# Patient Record
Sex: FEMALE | Race: WHITE | NOT HISPANIC OR LATINO | Employment: UNEMPLOYED | URBAN - METROPOLITAN AREA
[De-identification: names, ages, dates, MRNs, and addresses within clinical notes are randomized per-mention and may not be internally consistent; named-entity substitution may affect disease eponyms.]

---

## 2021-04-26 ENCOUNTER — IMMUNIZATION (OUTPATIENT)
Dept: IMMUNIZATION | Facility: CLINIC | Age: 26
End: 2021-04-26

## 2021-06-01 ENCOUNTER — IMMUNIZATION (OUTPATIENT)
Dept: IMMUNIZATION | Facility: CLINIC | Age: 26
End: 2021-06-01
Attending: FAMILY MEDICINE

## 2021-07-08 ENCOUNTER — DOCUMENTATION (OUTPATIENT)
Dept: BARIATRICS | Facility: CLINIC | Age: 26
End: 2021-07-08

## 2021-07-08 RX ORDER — FLUVOXAMINE MALEATE 50 MG/1
50 TABLET, FILM COATED ORAL NIGHTLY
COMMUNITY
End: 2022-11-22 | Stop reason: SDUPTHER

## 2021-07-08 RX ORDER — DEXTROAMPHETAMINE SACCHARATE, AMPHETAMINE ASPARTATE, DEXTROAMPHETAMINE SULFATE AND AMPHETAMINE SULFATE 3.75; 3.75; 3.75; 3.75 MG/1; MG/1; MG/1; MG/1
15 TABLET ORAL 2 TIMES DAILY
COMMUNITY
End: 2022-06-06

## 2021-07-08 RX ORDER — LEVOTHYROXINE SODIUM 100 UG/1
100 TABLET ORAL
COMMUNITY
End: 2021-09-27 | Stop reason: SDUPTHER

## 2021-07-08 RX ORDER — ARIPIPRAZOLE 30 MG/1
TABLET ORAL
COMMUNITY
Start: 2021-04-11 | End: 2022-06-06

## 2021-07-08 RX ORDER — LIOTHYRONINE SODIUM 5 UG/1
TABLET ORAL
COMMUNITY
Start: 2021-04-10 | End: 2021-09-27 | Stop reason: SDUPTHER

## 2021-07-08 RX ORDER — HYDROXYZINE PAMOATE 50 MG/1
50 CAPSULE ORAL 3 TIMES DAILY PRN
COMMUNITY
End: 2022-02-23

## 2021-07-15 ENCOUNTER — HOSPITAL ENCOUNTER (EMERGENCY)
Facility: HOSPITAL | Age: 26
Discharge: LEFT BEFORE DISCHARGE | End: 2021-07-15
Payer: MEDICARE

## 2021-07-15 VITALS
BODY MASS INDEX: 39.86 KG/M2 | HEART RATE: 98 BPM | RESPIRATION RATE: 18 BRPM | SYSTOLIC BLOOD PRESSURE: 135 MMHG | HEIGHT: 66 IN | DIASTOLIC BLOOD PRESSURE: 64 MMHG | TEMPERATURE: 97.8 F | OXYGEN SATURATION: 100 % | WEIGHT: 248 LBS

## 2021-07-19 ENCOUNTER — TRANSCRIBE ORDERS (OUTPATIENT)
Dept: SCHEDULING | Age: 26
End: 2021-07-19

## 2021-07-19 DIAGNOSIS — N80.9 ENDOMETRIOSIS, UNSPECIFIED: Primary | ICD-10-CM

## 2021-09-27 ENCOUNTER — OFFICE VISIT (OUTPATIENT)
Dept: FAMILY MEDICINE | Facility: CLINIC | Age: 26
End: 2021-09-27
Payer: MEDICARE

## 2021-09-27 VITALS
RESPIRATION RATE: 18 BRPM | HEART RATE: 100 BPM | SYSTOLIC BLOOD PRESSURE: 124 MMHG | BODY MASS INDEX: 41.3 KG/M2 | DIASTOLIC BLOOD PRESSURE: 72 MMHG | OXYGEN SATURATION: 96 % | HEIGHT: 66 IN | WEIGHT: 257 LBS

## 2021-09-27 DIAGNOSIS — Z87.898 HISTORY OF IDIOPATHIC SEIZURE: ICD-10-CM

## 2021-09-27 DIAGNOSIS — F41.9 ANXIETY: ICD-10-CM

## 2021-09-27 DIAGNOSIS — Z79.899 LONG-TERM USE OF HIGH-RISK MEDICATION: ICD-10-CM

## 2021-09-27 DIAGNOSIS — I73.00 RAYNAUD'S DISEASE WITHOUT GANGRENE: ICD-10-CM

## 2021-09-27 DIAGNOSIS — Z13.1 SCREENING FOR DIABETES MELLITUS: Primary | ICD-10-CM

## 2021-09-27 DIAGNOSIS — E03.9 ACQUIRED HYPOTHYROIDISM: ICD-10-CM

## 2021-09-27 DIAGNOSIS — F43.10 PTSD (POST-TRAUMATIC STRESS DISORDER): ICD-10-CM

## 2021-09-27 DIAGNOSIS — R00.0 TACHYCARDIA: ICD-10-CM

## 2021-09-27 DIAGNOSIS — F90.0 ATTENTION DEFICIT HYPERACTIVITY DISORDER (ADHD), PREDOMINANTLY INATTENTIVE TYPE: ICD-10-CM

## 2021-09-27 DIAGNOSIS — N80.9 ENDOMETRIOSIS: ICD-10-CM

## 2021-09-27 DIAGNOSIS — M72.2 PLANTAR FASCIITIS: ICD-10-CM

## 2021-09-27 DIAGNOSIS — F31.9 BIPOLAR 1 DISORDER (CMS/HCC): ICD-10-CM

## 2021-09-27 DIAGNOSIS — F33.1 MODERATE EPISODE OF RECURRENT MAJOR DEPRESSIVE DISORDER (CMS/HCC): ICD-10-CM

## 2021-09-27 DIAGNOSIS — K21.9 GASTROESOPHAGEAL REFLUX DISEASE, UNSPECIFIED WHETHER ESOPHAGITIS PRESENT: ICD-10-CM

## 2021-09-27 DIAGNOSIS — Z13.0 SCREENING FOR DEFICIENCY ANEMIA: ICD-10-CM

## 2021-09-27 DIAGNOSIS — E87.1 HYPONATREMIA: ICD-10-CM

## 2021-09-27 DIAGNOSIS — Z51.81 MEDICATION MONITORING ENCOUNTER: ICD-10-CM

## 2021-09-27 PROCEDURE — 99205 OFFICE O/P NEW HI 60 MIN: CPT | Performed by: NURSE PRACTITIONER

## 2021-09-27 RX ORDER — TRAZODONE HYDROCHLORIDE 100 MG/1
100 TABLET ORAL NIGHTLY
COMMUNITY
End: 2022-02-23

## 2021-09-27 RX ORDER — SERTRALINE HYDROCHLORIDE 25 MG/1
25 TABLET, FILM COATED ORAL DAILY
COMMUNITY
End: 2022-02-23

## 2021-09-27 RX ORDER — LIOTHYRONINE SODIUM 5 UG/1
5 TABLET ORAL DAILY
Qty: 30 TABLET | Refills: 0 | Status: SHIPPED | OUTPATIENT
Start: 2021-09-27 | End: 2021-11-19 | Stop reason: SDUPTHER

## 2021-09-27 RX ORDER — PROPRANOLOL HYDROCHLORIDE 60 MG/1
60 CAPSULE, EXTENDED RELEASE ORAL DAILY
Qty: 30 CAPSULE | Refills: 3 | Status: SHIPPED | OUTPATIENT
Start: 2021-09-27 | End: 2022-02-23 | Stop reason: SDUPTHER

## 2021-09-27 RX ORDER — CHOLECALCIFEROL (VITAMIN D3) 50 MCG
2000 TABLET ORAL DAILY
Qty: 90 TABLET | Refills: 1 | Status: SHIPPED | OUTPATIENT
Start: 2021-09-27 | End: 2022-09-27

## 2021-09-27 RX ORDER — LEVOTHYROXINE SODIUM 100 UG/1
100 TABLET ORAL
Qty: 30 TABLET | Refills: 0 | Status: SHIPPED | OUTPATIENT
Start: 2021-09-27 | End: 2021-11-19 | Stop reason: SDUPTHER

## 2021-09-27 ASSESSMENT — ENCOUNTER SYMPTOMS
POLYDIPSIA: 0
COUGH: 0
ABDOMINAL PAIN: 0
ACTIVITY CHANGE: 0
CHILLS: 0
PALPITATIONS: 1
SHORTNESS OF BREATH: 0
ABDOMINAL DISTENTION: 0
POLYPHAGIA: 0
FATIGUE: 0
FEVER: 0
APPETITE CHANGE: 1
SEIZURES: 1
HEADACHES: 1
UNEXPECTED WEIGHT CHANGE: 0
DIAPHORESIS: 0

## 2021-09-27 ASSESSMENT — PATIENT HEALTH QUESTIONNAIRE - PHQ9
SUM OF ALL RESPONSES TO PHQ9 QUESTIONS 1 & 2: 6
SUM OF ALL RESPONSES TO PHQ QUESTIONS 1-9: 14

## 2021-09-27 NOTE — PROGRESS NOTES
Reason for visit:   Chief Complaint   Patient presents with   • Establish Care     Would like to discuss thyroid issues. Was seeing Dr. Maged Rivera who was prescribing synthroid.       HPI  is a 26 y.o. female who presents as new patient       HPI  Moved here Feb 2021     Needs thyroid medication- no surgery hx of hypothyroid which did not respond to synthroid alone- start cytomel   No thyroid labs since December and no meds since April   Prior labs were stable     Psych hx: lots of hospitalizations   Suicide attempt x 1 no admission- 18 yo took 2 bottles of pain killers   No SI since then   signficant depression   Bipolar with rapid cycling  Anxiety out of control- would like to get back to working   Feels like whole body - queasy and heart palpitations   On xanax in past but did not like it   Disability x 2-3 years     Last barbara about spring 2020 idea of grandeur and lack of sleep denies this currently     Eval with psychiatry Rhonda Zhang- [place]health   Current meds Abilify 30 mg   Adderal 15 mg twice daiily   Luvox 25 mg daily   zoloft 25 mg daily   Trazodone 100 mg nightly   Vistaril 50 mg three times daily     meds trialed that did not work  Buspar, vistaril    Anxiety is most of the day - am and mid afternoon and bedtime  [employer] has gym membership 3 times a week  Sleep: lots of nightmares but sleeps solid 8 hours sleep  No meditations but does affirmations   Therapist: Tori Mckeon - sees q 2 weeks     Engaged and then  moved to [place] -dated 19 mo now -good relationship   Living with his family and good support   Partner Jimmega reevest later today   Met on insta months ago -did date online     Hx of seizure as baby and age 14 work up negative per her report none since   No license       Problem List:  Patient Active Problem List    Diagnosis Date Noted   • Raynaud's disease without gangrene 09/27/2021   • PTSD (post-traumatic stress disorder) 09/27/2021   • Moderate episode of recurrent  major depressive disorder (CMS/[facility]) 09/27/2021   • Bipolar 1 disorder (CMS/[facility]) 09/27/2021   • Anxiety 09/27/2021   • Acquired hypothyroidism 09/27/2021   • Endometriosis 09/27/2021   • History of idiopathic seizure 09/27/2021       Surgical History:  Past Surgical History:   Procedure Laterality Date   • LAPAROSCOPIC ENDOMETRIOSIS FULGURATION      2016 AND 2018       Social History:  Social History     Social History Narrative   • Not on file       Family History:  Family History   Problem Relation Age of Onset   • Thyroid disease Biological Mother    • Thyroid disease Biological Father        Allergies:  Doxycycline    Current Medications:  Current Outpatient Medications   Medication Sig Dispense Refill   • ARIPiprazole (ABILIFY) 30 mg tablet      • dextroamphetamine-amphetamine (AdderalL) 15 mg tablet Take 15 mg by mouth 2 (two) times a day.     • fluvoxaMINE (LUVOX) 50 mg tablet Take 50 mg by mouth nightly.     • hydrOXYzine (VistariL) 50 mg capsule Take 50 mg by mouth 3 (three) times a day as needed for itching.     • norethindrone-e.estradiol-iron (JUNEL FE 24 ORAL) Take by mouth.     • sertraline (ZOLOFT) 25 mg tablet Take 25 mg by mouth daily.     • traZODone (DESYREL) 100 mg tablet Take 100 mg by mouth nightly.     • levothyroxine (SYNTHROID) 100 mcg tablet Take 100 mcg by mouth daily.     • liothyronine (CYTOMEL) 5 mcg tablet        No current facility-administered medications for this visit.         Review of Systems:  Review of Systems   Constitutional: Positive for appetite change (poor with anxiety). Negative for activity change, chills, diaphoresis, fatigue, fever and unexpected weight change.   Respiratory: Negative for cough and shortness of breath.    Cardiovascular: Positive for palpitations.   Gastrointestinal: Negative for abdominal distention and abdominal pain.        Gi symptoms when anxious otherwise ok    Endocrine: Positive for cold intolerance. Negative for heat intolerance, polydipsia,  polyphagia and polyuria.   Genitourinary: Negative for menstrual problem and pelvic pain.        No menses on BCP    Neurological: Positive for seizures and headaches (chronic unchanged ).       Objective     Vital Signs:  Vitals:    09/27/21 1350   BP: 124/72   Pulse: 100   Resp: 18   SpO2: 96%       BMI:  Body mass index is 41.48 kg/m².     Physical Exam  Constitutional:       General: She is not in acute distress.     Appearance: She is well-developed. She is not diaphoretic.   HENT:      Head: Normocephalic and atraumatic.      Mouth/Throat:      Pharynx: No oropharyngeal exudate.      Comments: Clear airway   Eyes:      General: No scleral icterus.        Right eye: No discharge.         Left eye: No discharge.      Conjunctiva/sclera: Conjunctivae normal.      Pupils: Pupils are equal, round, and reactive to light.   Neck:      Thyroid: No thyromegaly.      Trachea: No tracheal deviation.      Comments: Thyroid is not enlarged.  No nodules palpable.  Normal swallow     Cardiovascular:      Rate and Rhythm: Regular rhythm. Tachycardia present.      Pulses: Normal pulses.      Heart sounds: Normal heart sounds. No murmur heard.  No friction rub. No gallop.    Pulmonary:      Effort: Pulmonary effort is normal. No respiratory distress.      Breath sounds: Normal breath sounds. No wheezing.   Abdominal:      General: Bowel sounds are normal. There is distension.      Palpations: Abdomen is soft. There is no mass.      Tenderness: There is no abdominal tenderness. There is no guarding or rebound.      Hernia: No hernia is present.   Musculoskeletal:         General: No tenderness. Normal range of motion.      Cervical back: Normal range of motion and neck supple.      Comments: No tenderness to c/t/l spine    Lymphadenopathy:      Cervical: No cervical adenopathy.   Skin:     General: Skin is warm and dry.      Capillary Refill: Capillary refill takes less than 2 seconds.      Findings: No rash.      Comments: Mult  scars /cutting no new lesions    Neurological:      General: No focal deficit present.      Mental Status: She is alert and oriented to person, place, and time.      Sensory: No sensory deficit.      Motor: No abnormal muscle tone.      Coordination: Coordination normal.   Psychiatric:         Mood and Affect: Mood normal.         Behavior: Behavior normal.         Thought Content: Thought content normal.         Judgment: Judgment normal.         Recent labs before today:     No results found for: WBC, HGB, HCT, PLT, CHOL, TRIG, HDL, LDLC, ALT, AST, NA, K, CL, CREATININE, BUN, CO2, TSH, PSA, INR, GLUC, HGBA1C, MICROALBUR    Patient Instructions   LABS FASTING     Start inderal LA 60 mg daily to address anxiety/palpitations     Vitamin D 2000 units daily     Start back on thyroid medications     Labs again in 2 months but see me in 4 weeks     Psychiatrists      Dr Noemy Lazo[address] [place] accepts Medicare     Dr Hilda Lo neuro psych [address] [facility] [phone]       Kailyn Witt MD *(Psychiatry)*  [address] [place] [address]   [address]   [place] [address]   [phone]   Accepts many insurance plans. Not Medicaid.  Daytime hours only.  4 month wait time for new patient appt.        Crisis Hotlines     [facility] Crisis Intervention Hotline 1-772.698.7657     [facility] Crisis Services ([facility]) 24/7 Hotline 1-729.765.6744     National Text Crisis Line 946668     National Suicide Prevention Lifeline 1-130.927.3450  www.suicidepreventionlifeline.org    [facility] Crisis Services 1-243.281.9722    [facility] Child Abuse Report Line 1-124.212.1011    [facility] Crisis Services 1-425.207.4017    [facility] ([facility]) 1-244.452.7625    [facility] ([facility]) 1-891.569.3346    [place] Crisis Line (Mobile Crisis) 1-499.424.1383    [facility]’s Crisis Response Center 1-866.463.5451          A great place  to start is the website -Www.psychologytoday.sMedio or your insurance company or your EAP       [facility] [place] [phone]    [facility]  Dr Renetta Nuñez [facility] [phone]    Dr Elo Barillas PhD [phone]    Fany Santos [facility] [phone]    [facility] [phone]    [facility] [phone]  Therapy, also psychiatry on site to medication management, intake and evaluation, CBT for sleep issues     [facility] resource for families and adolescents as marital issues, crisis, divorce, trauma, ADHD and life coaching therapists as well as pyschiatrists  -Braydon Garcia     Psychiatry- Dr Walker practice see CJ N. At this practice in [place]    [facility] [address] [place] [address] [phone]    [facility] in [facility]     [facility] *(Therapy/Counseling Only)*  [address] [facility], [address]  [place] [address] [phone]  [email or web address]  See website for specialties-child, adolescent, adult, and couples.  Out-of network providers. Self-pay and submit to insurance company for reimbursement  Wait time unknown-please call directly.  Day and evening weekday appointments available.    [facility]  (*Psychiatry* and Therapy)  [address] [place] [address]. [address]  [place] [address] [phone]  Other locations: [place]-[phone] or [place]-[phone]  [email or web address]  Takes most insurance plans  NO Medicaid-But take some CHIP plans  Day, Evening, Weekend appointments  Multiple counselors with varying specialties  Wait time for new pt.-2-3 weeks  Psychiatrist available-wait time may be different.      [facility] (*Psychiatry* and Therapy)  “3/2019-In process of  hiring adolescent psychiatrist-only adult psychiatrist as of this date”  [address] [place] [address]  [phone]  Other location: [address] [place] [address]  *Lorelei Cheung was recommended by a patient*  [email or web address]  Takes most insurance plans  No Medicaid  Varying specialties-see website   Day, evening, and weekend appointments  Adult psychiatrist in practice and in process of hiring adolescent psychiatrist    [facility]   [address] [place] [address] [address]  [place] [address] [phone] (this # for appts at all locations)  Other locations: [address] [place] [address] and [place], [place]  [email or web address]  Takes most insurance, Takes some other CHIP  NO Aetna Better Health and No Medicaid  Varying specialties-see website  Day, evening, and weekend appointments  Afternoon appts. book 1 month out and nights and w/e hard to get.  New patient appt. approx. 1-2 weeks wait if can come during school hours.  Adult psychiatry only    [facility] *(Child, Adolescent, and Adult Psychiatry)*  Dr. Angelica Strickland MD and many other psychiatrists and therapists  [address] [place] [address]  Other location: [address] [place] [address] [phone]  www.[email or web address] MediahealthVMIX Mediachoices.Glad to Have You  Takes most insurance  Does Not take Medicaid or CHIP  2-3 week wait time for new patients  Day, evening, and weekend appts.  Child, Adolescent, and Adult Psychiatry available    [employer] *(Child, Adolescent, and Adult Psychiatry)*  Multiple Psychiatrists and therapists  [address] [place] [address]  Other locations that provide different services. Call for details.  [phone]  www.[email or web address]  Varying child, adolescent, and adult specialties-see website-ALSO, HAVE Wraparound services, postpartum depression, Intensive outpatient programs, Family Based, mobile services (in  home).  Take private insurances and Take MEDICAID  Day, evening, and weekend appts.  They will OPEN appt times for URGENT patient needs    [employer] * (Therapy/Counseling Only)*  [address] [place] [address] [address], [place] [address]  Virtual office available  [phone]  [email or web address]  See website for specialties-*PET Therapy*  *Free 30 minute Consultation*, out-of network providers, $105 per visit but offer a sliding scale so will take less if needed.  New patients are seen within 7 d’s.  Day, evening, and weekend appts as well as virtual appts.    [facility] *(Therapy/Counseling Only)*  [address] [facility]  [place] [address]  [phone]  [email or web address]  See website for specialties-Child, Adolescent, and Adult  3 week - 1.5 month wait time for New patient appt (depends on provider)  Out-of network providers. Self-pay and submit to insurance company for reimbursement        Dr Angeles neurologist ADD evaluation and other mental health issues [place] [phone]    Martine Ott [facility] -[phone]      [facility] *(Psychiatry)*  Jose Enrique Goodwin MD   [address] [facility], [address]   [place] [address]   [phone]   Child and Adolescent   Takes various insurance plans.  Day and early evening hours.    [facility]  (Therapeutic Day School, Intensive Outpatient Program, Outpatient Program, Holistic Therapy, Support Groups)  [address] [place] [address]   [place] [address]   [phone]  2nd location: [address] [place] [address]   [place] [address]  [phone]   [email or web address]     [facility] *(Child, Adolescent, and Adult Psychiatry)*  Dr. Quinten Giron  [address] [place] [address]  [phone]  [email or web address]  DOES NOT take insurance  Varying specialties-see website  1 month wait for new patients  Child, Adolescent, and  Adult Psychiatry available    [facility] *(Child, Adolescent, and Adult Psychiatry)*  ADAM Lobo CRNP  [address] [place] [address]  [phone]  [email or web address]  DOES NOT take insurance  Varying specialties-see website  6-8 week wait time for new patients  Hours only 9-5 Monday -Friday. No evening and No weekend appts.  Child, Adolescent, and Adult Psychiatry available        [facility]   [address] [facility]   [address]   [place], [place]   [phone]   [facility] does not have inpatient psychiatric    [facility]   Child and Adolescent Psychiatry and Behavioral Sciences   [place] [phone]  www.[facility]/centers-programs/child-and-adolescent-psychiatry-and-behavioral-sciences      [facility] is good for intensive treatment you can self refer as well    [facility] multiple locations for intensive day treatment    [facility] is great outpatient intensive program associated with [facility]     [facility] has psych evaluations as well as a psychiatric unit       [facility] [address]  [place] [address] [phone]  [email or web address]  Day, Evening, Weekend appointments  Multiple counselors with varying specialties  Takes most insurance plans  Medicaid-No  No wait time for new patients if flexible  No Psychiatrist in practice    [employer]  [facility] [facility] [address]  [place] [address] [phone]  [email or web address]  Insurance plans taken: United [employer]al Health, Cigna, and Aetna  Medicaid-No  Offers sliding scale payment according to website.  Varying specialties-see website  Call re: apt times available  No wait time for new patient if flexible  No Psychiatrist in practice                 If you do not hear from me regarding results in 7-10 days either via a  call or the portal please call.      Problem List Items Addressed This Visit        Circulatory    Raynaud's disease without gangrene       Digestive    Gastroesophageal reflux disease       Musculoskeletal    PTSD (post-traumatic stress disorder)    Relevant Medications    traZODone (DESYREL) 100 mg tablet    sertraline (ZOLOFT) 25 mg tablet    Plantar fasciitis       Endocrine/Metabolic    Acquired hypothyroidism    Relevant Medications    levothyroxine (SYNTHROID) 100 mcg tablet    liothyronine (CYTOMEL) 5 mcg tablet    propranolol LA (INDERAL LA) 60 mg 24 hr capsule    Other Relevant Orders    TSH w reflex FT4    T3, free    Hyponatremia       Other    Moderate episode of recurrent major depressive disorder (CMS/HCC)    Relevant Medications    traZODone (DESYREL) 100 mg tablet    sertraline (ZOLOFT) 25 mg tablet    Bipolar 1 disorder (CMS/HCC)    Relevant Medications    traZODone (DESYREL) 100 mg tablet    sertraline (ZOLOFT) 25 mg tablet    Anxiety    Relevant Medications    propranolol LA (INDERAL LA) 60 mg 24 hr capsule    cholecalciferol, vitamin D3, 50 mcg (2,000 unit) tablet    Endometriosis    History of idiopathic seizure    Attention deficit hyperactivity disorder (ADHD), predominantly inattentive type    Relevant Medications    traZODone (DESYREL) 100 mg tablet    sertraline (ZOLOFT) 25 mg tablet    Long-term use of high-risk medication      Other Visit Diagnoses     Screening for diabetes mellitus    -  Primary    Medication monitoring encounter        Relevant Orders    Comprehensive metabolic panel    Tachycardia        Relevant Medications    propranolol LA (INDERAL LA) 60 mg 24 hr capsule    Other Relevant Orders    CBC and differential    Screening for deficiency anemia            Great insight in her health    Fu 4 weeks     New meds indural LA 60 mg daily     Fu with psych and therapist -consider bid dosing on ADAM Finch  9/27/2021

## 2021-09-27 NOTE — PATIENT INSTRUCTIONS
LABS FASTING     Start inderal LA 60 mg daily to address anxiety/palpitations     Vitamin D 2000 units daily     Start back on thyroid medications     Labs again in 2 months but see me in 4 weeks     Psychiatrists      Dr Noemy [address]azo[address] [place] accepts Medicare     Dr Hilda Lo neuro psych [address] [phone]       Kailyn Witt MD *(Psychiatry)*  [address]   [address]   [place] [address] [phone]   Accepts many insurance plans. Not Medicaid.  Daytime hours only.  4 month wait time for new patient appt.        Crisis Hotlines     [facility] Crisis Intervention Hotline 1-790.720.6440     [facility] Crisis Services ([facility]) 24/7 Hotline 1-164.770.1179     National Text Crisis Line 092722     National Suicide Prevention Lifeline 1-421.159.6527  www.suicidepreventionlifeline.org    [facility] Crisis Services 1-688.221.7228    [facility] Child Abuse Report Line 1-205.343.3347    [facility] Crisis Services 1-926.311.1987    Etta Jordangerald ([facility]) [phone]    [facility] ([facility]) [phone]    [place] Crisis Line (Mobile Crisis) 1-226.449.7558    [facility] 1-352.835.9536          A great place to start is the website -Www.[employer] or your insurance company or your EAP       [facility] [phone]    [facility]  Dr Renetta Nuñez PsyD [phone]    Dr Elo Barillas PhD [phone]    Fany Santos [facility] [phone]    [facility] [phone]    [facility] [phone]  Therapy, also psychiatry on site to medication management, intake and evaluation, CBT for sleep issues     [facility].org resource for families and  adolescents as marital issues, crisis, divorce, trauma, ADHD and life coaching therapists as well as pyschiatrists  -Braydon Garcia     Psychiatry- Dr Walker practice see CJ N. At this practice in [place]    [facility] [address] [facility] [phone]    [facility] in [facility]     [facility] *(Therapy/Counseling Only)*  [address]  [place] [address]  [phone]  www.[facility] HospitalForeSee.Drug Response Dx  See website for specialties-child, adolescent, adult, and couples.  Out-of network providers. Self-pay and submit to insurance company for reimbursement  Wait time unknown-please call directly.  Day and evening weekday appointments available.    [facility]  (*Psychiatry* and Therapy)  [address]  [place] [address]  [phone]  Other locations: Rbyeh-[phone] or [phone]  www.[facility]  Takes most insurance plans  NO Medicaid-But take some CHIP plans  Day, Evening, Weekend appointments  Multiple counselors with varying specialties  Wait time for new pt.-2-3 weeks  Psychiatrist available-wait time may be different.      [facility] (*Psychiatry* and Therapy)  “3/2019-In process of hiring adolescent psychiatrist-only adult psychiatrist as of this date”  [address] [place] [address] [phone]  Other location: [address] [place] [address]  *Lorelei Cheung was recommended by a patient*  www.[facility]com  Takes most insurance plans  No Medicaid  Varying specialties-see website   Day, evening, and weekend appointments  Adult psychiatrist in practice and in process of hiring adolescent psychiatrist    [facility]   [address] [place] [address] [address]  [place] [address] [phone] (this # for appts at all locations)  Other locations: [address] [place] [address]  and [place]  www.[email or web address].[email or web address]  Takes most insurance, Takes some other CHIP  NO Aetna Better [employer] and No Medicaid  Varying specialties-see website  Day, evening, and weekend appointments  Afternoon appts. book 1 month out and nights and w/e hard to get.  New patient appt. approx. 1-2 weeks wait if can come during school hours.  Adult psychiatry only    [facility] *(Child, Adolescent, and Adult Psychiatry)*  Dr. Angelica Strickland MD and many other psychiatrists and therapists  [address] [address]  Other location: [address] [place] [address] [phone]  www.behavioralEdCast Inc.healthEdCast Inc.choices.Edgeware  Takes most insurance  Does Not take Medicaid or CHIP  2-3 week wait time for new patients  Day, evening, and weekend appts.  Child, Adolescent, and Adult Psychiatry available    [facility] *(Child, Adolescent, and Adult Psychiatry)*  Multiple Psychiatrists and therapists  [address] [place] [address]  Other locations that provide different services. Call for details.  [phone]  www.[employer]Opiatalk  Varying child, adolescent, and adult specialties-see website-ALSO, HAVE Wraparound services, postpartum depression, Intensive outpatient programs, Family Based, mobile services (in home).  Take private insurances and Take MEDICAID  Day, evening, and weekend appts.  They will OPEN appt times for URGENT patient needs    [facility] * (Therapy/Counseling Only)*  [address] [place] [address], [place] [address]  Virtual office available  [phone]  www.[facility].net  See website for specialties-*PET Therapy*  *Free 30 minute Consultation*, out-of network providers, $105 per visit but offer a sliding scale so will take less if needed.  New patients are seen within 7 d’s.  Day, evening, and weekend appts as well as virtual appts.    [facility] *(Therapy/Counseling  Only)*  [address] [facility] [address] [place] [address]  [phone]  www.anxietyocd.Filmaster  See website for specialties-Child, Adolescent, and Adult  3 week - 1.5 month wait time for New patient appt (depends on provider)  Out-of network providers. Self-pay and submit to insurance company for reimbursement        Dr Angeles neurologist ADD evaluation and other mental health issues [place] [phone]    Martine Ott [facility] [phone]      [facility] *(Psychiatry)*  Jose Enrique Goodwin MD   [address]   [place] [address]   [phone]   Child and Adolescent   Takes various insurance plans.  Day and early evening hours.    [facility]  (Therapeutic Day School, Intensive Outpatient Program, Outpatient Program, Holistic Therapy, Support Groups)  [address] [place] [address]   [phone]  2nd location: [address] [place] [address]   [place] [address]  [phone]   www.[email or web address]     [facility] *(Child, Adolescent, and Adult Psychiatry)*  Dr. Quniten Giron  [address] [place] [address]  [phone]  www.[employer]  DOES NOT take insurance  Varying specialties-see website  1 month wait for new patients  Child, Adolescent, and Adult Psychiatry available    [facility] *(Child, Adolescent, and Adult Psychiatry)*  ADAM Lobo CRNP  [address] [place] [address]  [phone]  www.[email or web address]  DOES NOT take insurance  Varying specialties-see website  6-8 week wait time for new patients  Hours only 9-5 Monday -Friday. No evening and No weekend appts.  Child, Adolescent, and Adult Psychiatry available        [facility]   [address] [place] [address]   [phone]   [facility] does not have inpatient psychiatric    [facility]   Child and Adolescent Psychiatry and Behavioral Sciences   [place] [phone]  www.[facility]/centers-programs/child-and-adolescent-psychiatry-and-behavioral-sciences      [facility] is good for intensive treatment you can self refer as well    [facility] multiple locations for intensive day treatment    [facility] is great outpatient intensive program associated with [facility]     [facility] has psych evaluations as well as a psychiatric unit       [facility]  [address]  [place] [address] [phone]  www.[employer]  Day, Evening, Weekend appointments  Multiple counselors with varying specialties  Takes most insurance plans  Medicaid-No  No wait time for new patients if flexible  No Psychiatrist in practice    [facility]  [address] [place] [address] [address]  [place] [address] [phone]  www.[employer]  Insurance plans taken: [facility], Cigna, and Aetna  Medicaid-No  Offers sliding scale payment according to website.  Varying specialties-see website  Call re: apt times available  No wait time for new patient if flexible  No Psychiatrist in practice

## 2021-10-21 ENCOUNTER — OFFICE VISIT (OUTPATIENT)
Dept: FAMILY MEDICINE | Facility: CLINIC | Age: 26
End: 2021-10-21
Payer: MEDICARE

## 2021-10-21 VITALS
TEMPERATURE: 98.2 F | DIASTOLIC BLOOD PRESSURE: 72 MMHG | HEART RATE: 61 BPM | WEIGHT: 257 LBS | HEIGHT: 66 IN | OXYGEN SATURATION: 98 % | SYSTOLIC BLOOD PRESSURE: 116 MMHG | RESPIRATION RATE: 18 BRPM | BODY MASS INDEX: 41.3 KG/M2

## 2021-10-21 DIAGNOSIS — H69.92 EUSTACHIAN TUBE DISORDER, LEFT: Primary | ICD-10-CM

## 2021-10-21 DIAGNOSIS — F43.10 PTSD (POST-TRAUMATIC STRESS DISORDER): ICD-10-CM

## 2021-10-21 DIAGNOSIS — F31.9 BIPOLAR 1 DISORDER (CMS/HCC): ICD-10-CM

## 2021-10-21 DIAGNOSIS — Z23 NEED FOR VACCINATION: ICD-10-CM

## 2021-10-21 DIAGNOSIS — F33.1 MODERATE EPISODE OF RECURRENT MAJOR DEPRESSIVE DISORDER (CMS/HCC): ICD-10-CM

## 2021-10-21 DIAGNOSIS — K13.0 CHEILOSIS: ICD-10-CM

## 2021-10-21 PROCEDURE — G0008 ADMIN INFLUENZA VIRUS VAC: HCPCS | Performed by: NURSE PRACTITIONER

## 2021-10-21 PROCEDURE — 99212 OFFICE O/P EST SF 10 MIN: CPT | Performed by: NURSE PRACTITIONER

## 2021-10-21 PROCEDURE — 90686 IIV4 VACC NO PRSV 0.5 ML IM: CPT | Performed by: NURSE PRACTITIONER

## 2021-10-21 RX ORDER — VORTIOXETINE 10 MG/1
10 TABLET, FILM COATED ORAL DAILY
COMMUNITY
End: 2022-06-06

## 2021-10-21 RX ORDER — NYSTATIN AND TRIAMCINOLONE ACETONIDE 100000; 1 [USP'U]/G; MG/G
OINTMENT TOPICAL
Qty: 60 G | Refills: 0 | Status: SHIPPED | OUTPATIENT
Start: 2021-10-21 | End: 2022-10-11

## 2021-10-21 ASSESSMENT — ENCOUNTER SYMPTOMS
HEADACHES: 0
SORE THROAT: 0
VOMITING: 0
COUGH: 0
ABDOMINAL PAIN: 0
RHINORRHEA: 0
DIARRHEA: 0

## 2021-10-21 NOTE — PROGRESS NOTES
Reason for visit:   Chief Complaint   Patient presents with   • Follow-up     was scheduled for possible ear infection - has since felt better and would now like to discuss refils.        HPI  is a 26 y.o. female     Earache / Otalgia   There is pain in the left ear. This is a new problem. Episode onset: few days ago  The problem has been rapidly improving. There has been no fever. Associated symptoms include ear discharge (lots of ear wax ). Pertinent negatives include no abdominal pain, coughing, diarrhea, headaches, hearing loss, rash, rhinorrhea, sore throat or vomiting. She has tried nothing for the symptoms.       Improved     About to run out of meds reports one day left -sees psych but reports they are not calling her back   Problem List:  Patient Active Problem List    Diagnosis Date Noted   • Raynaud's disease without gangrene 09/27/2021   • PTSD (post-traumatic stress disorder) 09/27/2021   • Moderate episode of recurrent major depressive disorder (CMS/[facility]) 09/27/2021   • Bipolar 1 disorder (CMS/[facility]) 09/27/2021   • Anxiety 09/27/2021   • Acquired hypothyroidism 09/27/2021   • Endometriosis 09/27/2021   • History of idiopathic seizure 09/27/2021   • Attention deficit hyperactivity disorder (ADHD), predominantly inattentive type 09/27/2021   • Hyponatremia 09/27/2021   • Plantar fasciitis 09/27/2021   • Gastroesophageal reflux disease 09/27/2021   • Long-term use of high-risk medication 09/27/2021       Surgical History:  Past Surgical History:   Procedure Laterality Date   • LAPAROSCOPIC ENDOMETRIOSIS FULGURATION      2016 AND 2018       Social History:  Social History     Social History Narrative    Moved from [place] for fiance met on insta        Enjoys gardening, wood burning, rose and painting         On disability x 3 years but would like to return to work            Family History:  Family History   Problem Relation Age of Onset   • Thyroid disease Biological Mother    • Mental illness Biological  Mother    • Thyroid disease Biological Father    • Mental illness Biological Father    • Depression Biological Sister    • Depression Biological Brother    • No Known Problems Maternal Grandmother    • Colon cancer Maternal Grandfather    • No Known Problems Paternal Grandmother    • No Known Problems Paternal Grandfather    • Depression Biological Sister    • Depression Biological Sister        Allergies:  Doxycycline    Current Medications:  Current Outpatient Medications   Medication Sig Dispense Refill   • ARIPiprazole (ABILIFY) 30 mg tablet      • cholecalciferol, vitamin D3, 50 mcg (2,000 unit) tablet Take 2,000 Units by mouth daily. 90 tablet 1   • dextroamphetamine-amphetamine (AdderalL) 15 mg tablet Take 15 mg by mouth 2 (two) times a day.     • fluvoxaMINE (LUVOX) 50 mg tablet Take 50 mg by mouth nightly.     • hydrOXYzine (VistariL) 50 mg capsule Take 50 mg by mouth 3 (three) times a day as needed for itching.     • levothyroxine (SYNTHROID) 100 mcg tablet Take 1 tablet (100 mcg total) by mouth daily. 30 tablet 0   • liothyronine (CYTOMEL) 5 mcg tablet Take 1 tablet (5 mcg total) by mouth daily. 30 tablet 0   • norethindrone-e.estradiol-iron (JUNEL FE 24 ORAL) Take by mouth.     • propranolol LA (INDERAL LA) 60 mg 24 hr capsule Take 1 capsule (60 mg total) by mouth daily. 30 capsule 3   • sertraline (ZOLOFT) 25 mg tablet Take 25 mg by mouth daily.     • traZODone (DESYREL) 100 mg tablet Take 100 mg by mouth nightly.     • vortioxetine (TRINTELLIX) 10 mg tablet Take 10 mg by mouth daily.       No current facility-administered medications for this visit.         Review of Systems:  Review of Systems   HENT: Positive for ear discharge (lots of ear wax ) and ear pain. Negative for hearing loss, rhinorrhea and sore throat.    Respiratory: Negative for cough.    Gastrointestinal: Negative for abdominal pain, diarrhea and vomiting.   Skin: Negative for rash.   Neurological: Negative for headaches.        Objective     Vital Signs:  Vitals:    10/21/21 1419   BP: 116/72   Pulse: 61   Resp: 18   Temp: 36.8 °C (98.2 °F)   SpO2: 98%       BMI:  Body mass index is 41.48 kg/m².     Physical Exam  Constitutional:       Appearance: Normal appearance.   HENT:      Head: Normocephalic and atraumatic.      Right Ear: Tympanic membrane normal. There is no impacted cerumen.      Left Ear: Tympanic membrane normal. There is no impacted cerumen.   Eyes:      Pupils: Pupils are equal, round, and reactive to light.   Cardiovascular:      Rate and Rhythm: Normal rate and regular rhythm.      Pulses: Normal pulses.   Pulmonary:      Effort: Pulmonary effort is normal.      Breath sounds: Normal breath sounds.   Skin:     Comments: + excoriations to sides of mouth with scaling no blisters    Neurological:      Mental Status: She is alert.   Psychiatric:         Mood and Affect: Mood normal.         Thought Content: Thought content normal.         Judgment: Judgment normal.      Comments: No barbara on exam          Recent labs before today:     No results found for: WBC, HGB, HCT, PLT, CHOL, TRIG, HDL, LDLC, ALT, AST, NA, K, CL, CREATININE, BUN, CO2, TSH, PSA, INR, GLUC, HGBA1C, MICROALBUR    Patient Instructions     Psychiatrists    Dr Kailyn Witt [address] [place] [address] [phone]    Martine Ott [facility] [phone]    Dr Noemy Lazo[address] [address] [address] [place] accepts Medicare     [facility]:  Anne-Marie Braun ( NP- does geriatric and adolescent)  Zo [facility] adolescent and women's issues and eating disorders [address] [place], [place] [phone]    Dr Hilda Lo neuro psych [place]     Maddie [facility] [phone]      If you do not hear from me regarding results in 7-10 days either via a call or the portal please call.      Problem List Items Addressed This Visit        Musculoskeletal    PTSD (post-traumatic stress disorder)    Relevant  Medications    vortioxetine (TRINTELLIX) 10 mg tablet       Other    Moderate episode of recurrent major depressive disorder (CMS/HCC)    Relevant Medications    vortioxetine (TRINTELLIX) 10 mg tablet    Bipolar 1 disorder (CMS/HCC)    Relevant Medications    vortioxetine (TRINTELLIX) 10 mg tablet      Other Visit Diagnoses     Eustachian tube disorder, left    -  Primary    Cheilosis        Relevant Medications    nystatin-triamcinolone ointment    Need for vaccination        Relevant Orders    Influenza vaccine quadrivalent preservative free 6 mon and older IM (FluLaval)      Offered to call psych with her but she then stated she has one week of medications left. She will work to obtain a call back and offered we are happy to assist if needed.  She is searching and has recc and calls out to other providers.  Due to the complexity of her care and PMH her medications should be managed by psychiatry and she was agreeable to this.      Ear is improving with negative exam.           ADAM Darden  10/21/2021

## 2021-10-21 NOTE — PATIENT INSTRUCTIONS
Psychiatrists    Dr Kailyn Witt [address] [facility] [phone]    Martine Ott [facility] [phone]    Dr Noemy Lazo[address] [facility] [address] [place] accepts Medicare     RBE:  Anne-Marie Braun ( NP- does geriatric and adolescent)  Zo Cabezas MSW adolescent and women's issues and eating disorders [address] [place], [place] [phone]    Dr Hilda Lo neuro psych [place]     [facility] [phone]    
(4) excellent

## 2021-10-22 PROCEDURE — G0008 ADMIN INFLUENZA VIRUS VAC: HCPCS | Performed by: NURSE PRACTITIONER

## 2021-10-22 PROCEDURE — 90686 IIV4 VACC NO PRSV 0.5 ML IM: CPT | Performed by: NURSE PRACTITIONER

## 2021-11-19 ENCOUNTER — OFFICE VISIT (OUTPATIENT)
Dept: FAMILY MEDICINE | Facility: CLINIC | Age: 26
End: 2021-11-19
Payer: MEDICARE

## 2021-11-19 VITALS
DIASTOLIC BLOOD PRESSURE: 80 MMHG | SYSTOLIC BLOOD PRESSURE: 120 MMHG | RESPIRATION RATE: 18 BRPM | HEART RATE: 81 BPM | OXYGEN SATURATION: 97 % | TEMPERATURE: 97.7 F | BODY MASS INDEX: 42.35 KG/M2 | WEIGHT: 262.4 LBS

## 2021-11-19 DIAGNOSIS — E03.9 ACQUIRED HYPOTHYROIDISM: ICD-10-CM

## 2021-11-19 DIAGNOSIS — N10 ACUTE PYELONEPHRITIS: ICD-10-CM

## 2021-11-19 DIAGNOSIS — M54.50 ACUTE BILATERAL LOW BACK PAIN WITHOUT SCIATICA: Primary | ICD-10-CM

## 2021-11-19 LAB
BILIRUBIN, POC: NEGATIVE
BLOOD URINE, POC: POSITIVE
CLARITY, POC: NORMAL
COLOR, POC: YELLOW
EXPIRATION DATE: NORMAL
GLUCOSE URINE, POC: NEGATIVE
KETONES, POC: NEGATIVE
LEUKOCYTE EST, POC: NORMAL
Lab: NORMAL
NITRITE, POC: NEGATIVE
PH, POC: 5
POCT MANUFACTURER: NORMAL
PROTEIN, POC: NORMAL
SPECIFIC GRAVITY, POC: 1.01
UROBILINOGEN, POC: 0.2

## 2021-11-19 PROCEDURE — 81002 URINALYSIS NONAUTO W/O SCOPE: CPT | Performed by: NURSE PRACTITIONER

## 2021-11-19 PROCEDURE — 99214 OFFICE O/P EST MOD 30 MIN: CPT | Performed by: NURSE PRACTITIONER

## 2021-11-19 RX ORDER — LIOTHYRONINE SODIUM 5 UG/1
5 TABLET ORAL DAILY
Qty: 30 TABLET | Refills: 0 | Status: SHIPPED | OUTPATIENT
Start: 2021-11-19 | End: 2022-10-11 | Stop reason: SDUPTHER

## 2021-11-19 RX ORDER — LEVOTHYROXINE SODIUM 100 UG/1
100 TABLET ORAL
Qty: 30 TABLET | Refills: 0 | Status: SHIPPED | OUTPATIENT
Start: 2021-11-19 | End: 2022-02-23

## 2021-11-19 RX ORDER — SULFAMETHOXAZOLE AND TRIMETHOPRIM 800; 160 MG/1; MG/1
1 TABLET ORAL 2 TIMES DAILY
Qty: 14 TABLET | Refills: 0 | Status: SHIPPED | OUTPATIENT
Start: 2021-11-19 | End: 2021-11-26

## 2021-11-19 ASSESSMENT — ENCOUNTER SYMPTOMS
WEIGHT LOSS: 0
FEVER: 0
ABDOMINAL PAIN: 0
DYSURIA: 0
BACK PAIN: 1
PERIANAL NUMBNESS: 0
WEAKNESS: 0
LEG PAIN: 0
BOWEL INCONTINENCE: 0

## 2021-11-19 NOTE — PROGRESS NOTES
Reason for visit:   Chief Complaint   Patient presents with   • Follow-up     possible bladder infection, back pain x 1.5 weeks        HPI  is a 26 y.o. female     Back Pain  This is a new problem. The current episode started in the past 7 days. The problem has been gradually worsening since onset. The pain is present in the lumbar spine. The quality of the pain is described as aching. The pain is moderate. The symptoms are aggravated by bending. Pertinent negatives include no abdominal pain, bladder incontinence, bowel incontinence, dysuria, fever, leg pain, pelvic pain, perianal numbness, weakness or weight loss. (No vaginal discharge   ) She has tried analgesics and NSAIDs for the symptoms. The treatment provided mild relief.         Problem List:  Patient Active Problem List    Diagnosis Date Noted   • Raynaud's disease without gangrene 09/27/2021   • PTSD (post-traumatic stress disorder) 09/27/2021   • Moderate episode of recurrent major depressive disorder (CMS/HCC) 09/27/2021   • Bipolar 1 disorder (CMS/[facility]) 09/27/2021   • Anxiety 09/27/2021   • Acquired hypothyroidism 09/27/2021   • Endometriosis 09/27/2021   • History of idiopathic seizure 09/27/2021   • Attention deficit hyperactivity disorder (ADHD), predominantly inattentive type 09/27/2021   • Hyponatremia 09/27/2021   • Plantar fasciitis 09/27/2021   • Gastroesophageal reflux disease 09/27/2021   • Long-term use of high-risk medication 09/27/2021       Surgical History:  Past Surgical History:   Procedure Laterality Date   • LAPAROSCOPIC ENDOMETRIOSIS FULGURATION      2016 AND 2018       Social History:  Social History     Social History Narrative    Moved from [place] for [facility] met on [employer]        Enjoys gardening, wood burning, rose and painting         On disability x 3 years but would like to return to work            Family History:  Family History   Problem Relation Age of Onset   • Thyroid disease Biological Mother    • Mental illness Biological  Mother    • Thyroid disease Biological Father    • Mental illness Biological Father    • Depression Biological Sister    • Depression Biological Brother    • No Known Problems Maternal Grandmother    • Colon cancer Maternal Grandfather    • No Known Problems Paternal Grandmother    • No Known Problems Paternal Grandfather    • Depression Biological Sister    • Depression Biological Sister        Allergies:  Doxycycline    Current Medications:  Current Outpatient Medications   Medication Sig Dispense Refill   • ARIPiprazole (ABILIFY) 30 mg tablet      • cholecalciferol, vitamin D3, 50 mcg (2,000 unit) tablet Take 2,000 Units by mouth daily. 90 tablet 1   • dextroamphetamine-amphetamine (AdderalL) 15 mg tablet Take 15 mg by mouth 2 (two) times a day.     • fluvoxaMINE (LUVOX) 50 mg tablet Take 50 mg by mouth nightly.     • hydrOXYzine (VistariL) 50 mg capsule Take 50 mg by mouth 3 (three) times a day as needed for itching.     • levothyroxine (SYNTHROID) 100 mcg tablet Take 1 tablet (100 mcg total) by mouth daily. 30 tablet 0   • norethindrone-e.estradiol-iron (JUNEL FE 24 ORAL) Take by mouth.     • propranolol LA (INDERAL LA) 60 mg 24 hr capsule Take 1 capsule (60 mg total) by mouth daily. 30 capsule 3   • vortioxetine (TRINTELLIX) 10 mg tablet Take 10 mg by mouth daily.     • liothyronine (CYTOMEL) 5 mcg tablet Take 1 tablet (5 mcg total) by mouth daily. 30 tablet 0   • nystatin-triamcinolone ointment Apply thin layer twice daily for one week then once daily for one week but stop at anytime the lesions/rash resolves.  Less is more 60 g 0   • sertraline (ZOLOFT) 25 mg tablet Take 25 mg by mouth daily.     • sulfamethoxazole-trimethoprim 800-160 mg per tablet Take 1 tablet by mouth 2 (two) times a day for 7 days. 14 tablet 0   • traZODone (DESYREL) 100 mg tablet Take 100 mg by mouth nightly.       No current facility-administered medications for this visit.         Review of Systems:  Review of Systems    Constitutional: Negative for fever and weight loss.   Gastrointestinal: Negative for abdominal pain and bowel incontinence.   Genitourinary: Negative for bladder incontinence, dysuria and pelvic pain.   Musculoskeletal: Positive for back pain.   Neurological: Negative for weakness.       Objective     Vital Signs:  Vitals:    11/19/21 1137   BP: 120/80   Pulse: 81   Resp: 18   Temp: 36.5 °C (97.7 °F)   SpO2: 97%       BMI:  Body mass index is 42.35 kg/m².     Physical Exam  Cardiovascular:      Rate and Rhythm: Normal rate and regular rhythm.   Pulmonary:      Effort: Pulmonary effort is normal.      Breath sounds: Normal breath sounds.   Abdominal:      General: Bowel sounds are normal.      Palpations: Abdomen is soft.      Tenderness: There is no abdominal tenderness. There is no right CVA tenderness, left CVA tenderness, guarding or rebound.      Hernia: No hernia is present.   Musculoskeletal:      Comments: - SLR  Normal gait   Normal strength BLE   No tenderness with palp t/l spine   No tenderness on paraspinals   Skin:     Capillary Refill: Capillary refill takes less than 2 seconds.   Neurological:      General: No focal deficit present.         Recent labs before today:     No results found for: WBC, HGB, HCT, PLT, CHOL, TRIG, HDL, LDLC, ALT, AST, NA, K, CL, CREATININE, BUN, CO2, TSH, PSA, INR, GLUC, HGBA1C, MICROALBUR    Patient Instructions   Labs when you get a chance     Start Bactrim DS one tablet twice daily for one week    We will call you with results     Please take all of the antibiotic above.      If you have back pain, fever, chills or sweats, see blood in your urine, have difficulty urinating please call our office urgently or go for urgent evaluation.      You may also use AZO (regular pyridium) take as directed while you are having painful urination or frequency.  This is safe for you and will help with symptoms and also improve the Ph of your urine.  It will turn your urine fluorescent  orange and that is normal and ok.  You may note some in your underwear as well as you may leak a bit with this infection.  It will resolve when you finish this medication.  You only need to take to help if you are having painful urination.  Once that resolves you may stop.      Please repeat a urine 2-6 weeks after you finish the antibiotic.  We like to ensure there is no blood in your urine as this would always require further work up if there is not an infection.          If you do not hear from me regarding results in 7-10 days either via a call or the portal please call.      Problem List Items Addressed This Visit        Endocrine/Metabolic    Acquired hypothyroidism    Relevant Medications    levothyroxine 100 mcg tablet    liothyronine 5 mcg tablet      Other Visit Diagnoses     Acute bilateral low back pain without sciatica    -  Primary    Relevant Orders    POCT urinalysis dipstick    Urine culture (clean catch)    Urinalysis with microscopic    Acute pyelonephritis        Relevant Medications    sulfamethoxazole-trimethoprim 800-160 mg per tablet    Other Relevant Orders    POCT urinalysis dipstick    Urine culture (clean catch)    Urinalysis with microscopic           stresed labs   Urine v positive     ADAM Darden  11/19/2021

## 2021-11-19 NOTE — PATIENT INSTRUCTIONS
Labs when you get a chance     Start Bactrim DS one tablet twice daily for one week    We will call you with results     Please take all of the antibiotic above.      If you have back pain, fever, chills or sweats, see blood in your urine, have difficulty urinating please call our office urgently or go for urgent evaluation.      You may also use AZO (regular pyridium) take as directed while you are having painful urination or frequency.  This is safe for you and will help with symptoms and also improve the Ph of your urine.  It will turn your urine fluorescent orange and that is normal and ok.  You may note some in your underwear as well as you may leak a bit with this infection.  It will resolve when you finish this medication.  You only need to take to help if you are having painful urination.  Once that resolves you may stop.      Please repeat a urine 2-6 weeks after you finish the antibiotic.  We like to ensure there is no blood in your urine as this would always require further work up if there is not an infection.

## 2021-11-20 LAB
APPEARANCE UR: ABNORMAL
BACTERIA #/AREA URNS HPF: ABNORMAL /HPF
BACTERIA UR CULT: NORMAL
BILIRUB UR QL STRIP: NEGATIVE
COLOR UR: YELLOW
GLUCOSE UR QL STRIP: NEGATIVE
HGB UR QL STRIP: NEGATIVE
HYALINE CASTS #/AREA URNS LPF: ABNORMAL /LPF
KETONES UR QL STRIP: NEGATIVE
LEUKOCYTE ESTERASE UR QL STRIP: ABNORMAL
NITRITE UR QL STRIP: NEGATIVE
PH UR STRIP: 5.5 [PH] (ref 5–8)
PROT UR QL STRIP: NEGATIVE
RBC #/AREA URNS HPF: ABNORMAL /HPF
SP GR UR STRIP: 1.02 (ref 1–1.03)
SQUAMOUS #/AREA URNS HPF: ABNORMAL /HPF
WBC #/AREA URNS HPF: ABNORMAL /HPF

## 2021-11-22 NOTE — RESULT ENCOUNTER NOTE
Geneva your urine did not grow out anything.  It looks like contamination.  Can you please let me know if you are still having symptoms.  If it all resolved then finish the bactrim- if not you can stop and let me know.

## 2022-02-23 ENCOUNTER — TELEMEDICINE (OUTPATIENT)
Dept: FAMILY MEDICINE | Facility: CLINIC | Age: 27
End: 2022-02-23
Payer: MEDICARE

## 2022-02-23 DIAGNOSIS — F41.1 GENERALIZED ANXIETY DISORDER: ICD-10-CM

## 2022-02-23 DIAGNOSIS — F31.9 BIPOLAR 1 DISORDER (CMS/HCC): ICD-10-CM

## 2022-02-23 DIAGNOSIS — J40 BRONCHITIS: Primary | ICD-10-CM

## 2022-02-23 PROCEDURE — 99214 OFFICE O/P EST MOD 30 MIN: CPT | Mod: 95 | Performed by: FAMILY MEDICINE

## 2022-02-23 RX ORDER — PROPRANOLOL HYDROCHLORIDE 60 MG/1
60 CAPSULE, EXTENDED RELEASE ORAL DAILY
Qty: 30 CAPSULE | Refills: 3 | Status: SHIPPED | OUTPATIENT
Start: 2022-02-23 | End: 2022-05-24

## 2022-02-23 RX ORDER — LEVOTHYROXINE SODIUM 100 UG/1
100 TABLET ORAL
COMMUNITY
End: 2022-10-11 | Stop reason: SDUPTHER

## 2022-02-23 RX ORDER — AZITHROMYCIN 250 MG/1
TABLET, FILM COATED ORAL
Qty: 6 TABLET | Refills: 0 | Status: SHIPPED | OUTPATIENT
Start: 2022-02-23 | End: 2022-02-28

## 2022-02-23 ASSESSMENT — ENCOUNTER SYMPTOMS
BACK PAIN: 0
HEADACHES: 0
RHINORRHEA: 0
WHEEZING: 1
SINUS PRESSURE: 0
FEVER: 0
CHEST TIGHTNESS: 0
SHORTNESS OF BREATH: 0
DIZZINESS: 0
LIGHT-HEADEDNESS: 0
SORE THROAT: 0
FATIGUE: 1
SLEEP DISTURBANCE: 0
COUGH: 1
APPETITE CHANGE: 0
NERVOUS/ANXIOUS: 1
ARTHRALGIAS: 0

## 2022-02-23 NOTE — PROGRESS NOTES
Verification of Patient Location:  The patient affirms they are currently located in the following state: [place]    Request for Consent:    Audio and Video Encounter   Dede, my name is Gerson DO Krystal.  Before we proceed, can you please verify your identification by telling me your full name and date of birth?  Can you tell me who is in the room with you?    You and I are about to have a telemedicine check-in or visit because you have requested it.  This is a live video-conference.  I am a real person, speaking to you in real time.  There is no one else with me on the video-conference.  However, when we use ([employer], [employer], etc) it is important for you to know that the video-conference may not be secure or private.  I am not recording this conversation and I am asking you not to record it.  This telemedicine visit will be billed to your health insurance or you, if you are self-insured.  You understand you will be responsible for any copayments or coinsurances that apply to your telemedicine visit.  Communication platform used for this encounter:  [employer]     Before starting our telemedicine visit, I am required to get your consent for this virtual check-in or visit by telemedicine. Do you consent?      Patient Response to Request for Consent:  Yes      Visit Documentation:  Subjective     Patient ID: Geneva Roman is a 26 y.o. female.  1995      HPI    Patient is c/o worsening wheezing and productive cough that started 2 weeks ago. Denies fever, chills, and SOB. Has tried cough drops with mild relief in symptoms. She ran out of her propranolol for anxiety and symptoms have been worsening and had one panic attack within the last few weeks.    The following have been reviewed and updated as appropriate in this visit:   Allergies  Meds  Problems       Review of Systems   Constitutional: Positive for fatigue. Negative for appetite change and fever.   HENT: Negative for hearing loss, rhinorrhea,  sinus pressure and sore throat.    Respiratory: Positive for cough and wheezing. Negative for chest tightness and shortness of breath.    Cardiovascular: Negative for chest pain.   Musculoskeletal: Negative for arthralgias and back pain.   Neurological: Negative for dizziness, light-headedness and headaches.   Psychiatric/Behavioral: Negative for behavioral problems, self-injury, sleep disturbance and suicidal ideas. The patient is nervous/anxious.          Assessment/Plan   Diagnoses and all orders for this visit:    Bronchitis (Primary)  Comments:  >7 days of worsening sx. continure conservative tx. prescribed zpak. ED precautions given. FU as needed  Orders:  -     azithromycin (ZITHROMAX) 250 mg tablet; Take 2 tablets the first day, then 1 tablet daily for 4 days.    Generalized anxiety disorder  Comments:  sx worsening since running out of propranolol. refills given. follow up as needed with pcp  Orders:  -     propranolol LA (INDERAL LA) 60 mg 24 hr capsule; Take 1 capsule (60 mg total) by mouth daily.    Bipolar 1 disorder (CMS/[facility])  Comments:  stable. cont follow up with pcp        Time Spent:  I spent 11 minutes on this date of service performing the following activities: obtaining history, entering orders, documenting, preparing for visit, obtaining / reviewing records and providing counseling and education.

## 2022-03-15 ENCOUNTER — OFFICE VISIT (OUTPATIENT)
Dept: FAMILY MEDICINE | Facility: CLINIC | Age: 27
End: 2022-03-15
Payer: MEDICARE

## 2022-03-15 VITALS
WEIGHT: 273 LBS | HEART RATE: 74 BPM | HEIGHT: 66 IN | TEMPERATURE: 97.9 F | OXYGEN SATURATION: 98 % | SYSTOLIC BLOOD PRESSURE: 116 MMHG | BODY MASS INDEX: 43.87 KG/M2 | DIASTOLIC BLOOD PRESSURE: 80 MMHG

## 2022-03-15 DIAGNOSIS — R05.9 COUGH: Primary | ICD-10-CM

## 2022-03-15 DIAGNOSIS — F17.200 SMOKER: ICD-10-CM

## 2022-03-15 PROCEDURE — 99213 OFFICE O/P EST LOW 20 MIN: CPT | Performed by: NURSE PRACTITIONER

## 2022-03-15 ASSESSMENT — ENCOUNTER SYMPTOMS
MYALGIAS: 0
RHINORRHEA: 0
COUGH: 1
WEIGHT LOSS: 0
SWEATS: 0
HEMOPTYSIS: 0
HEADACHES: 0
FEVER: 0
CHILLS: 0
SHORTNESS OF BREATH: 1
WHEEZING: 0
SORE THROAT: 0
HEARTBURN: 0

## 2022-03-15 NOTE — PROGRESS NOTES
Reason for visit:   Chief Complaint   Patient presents with   • Follow-up     Has had cough since Dec, was prescribed 2 different antibiotics, neither have worked.  Cough is deep and she has wheezing.       HPI  is a 26 y.o. female     Cough  This is a chronic problem. Episode onset: one month ago  The problem has been unchanged. The cough is productive of purulent sputum. Associated symptoms include shortness of breath (only going up and down stairs which is 10 times per day ). Pertinent negatives include no chest pain, chills, ear congestion, ear pain, fever, headaches, heartburn, hemoptysis, myalgias, nasal congestion, postnasal drip, rhinorrhea, sore throat, sweats, weight loss or wheezing. Exacerbated by: smoking, worse in am after sleeping. Treatments tried: zpak x 3 days which improved, Zyrtec not beneficial  There is no history of asthma, bronchiectasis or emphysema.       Chart reviewed seen with Dr Rice- 2/23 reported cough started 2 weeks ago   Given zpak    Seen in ED 2/26 stopped zpak and treated for UTI with macrobid     Denies pregnancy chance   Takes Junel routinely no menses       Problem List:  Patient Active Problem List    Diagnosis Date Noted   • Raynaud's disease without gangrene 09/27/2021   • PTSD (post-traumatic stress disorder) 09/27/2021   • Moderate episode of recurrent major depressive disorder (CMS/HCC) 09/27/2021   • Bipolar 1 disorder (CMS/[facility]) 09/27/2021   • Anxiety 09/27/2021   • Acquired hypothyroidism 09/27/2021   • Endometriosis 09/27/2021   • History of idiopathic seizure 09/27/2021   • Attention deficit hyperactivity disorder (ADHD), predominantly inattentive type 09/27/2021   • Hyponatremia 09/27/2021   • Plantar fasciitis 09/27/2021   • Gastroesophageal reflux disease 09/27/2021   • Long-term use of high-risk medication 09/27/2021       Surgical History:  Past Surgical History:   Procedure Laterality Date   • LAPAROSCOPIC ENDOMETRIOSIS FULGURATION      2016 AND 2018        Social History:  Social History     Social History Narrative    Moved from [place] for [place] met on [employer]        Enjoys gardening, wood burning, rose and painting         On disability x 3 years but would like to return to work            Family History:  Family History   Problem Relation Age of Onset   • Thyroid disease Biological Mother    • Mental illness Biological Mother    • Thyroid disease Biological Father    • Mental illness Biological Father    • Depression Biological Sister    • Depression Biological Brother    • No Known Problems Maternal Grandmother    • Colon cancer Maternal Grandfather    • No Known Problems Paternal Grandmother    • No Known Problems Paternal Grandfather    • Depression Biological Sister    • Depression Biological Sister        Allergies:  Doxycycline    Current Medications:  Current Outpatient Medications   Medication Sig Dispense Refill   • ARIPiprazole (ABILIFY) 30 mg tablet      • cholecalciferol, vitamin D3, 50 mcg (2,000 unit) tablet Take 2,000 Units by mouth daily. 90 tablet 1   • dextroamphetamine-amphetamine (AdderalL) 15 mg tablet Take 15 mg by mouth 2 (two) times a day.     • fluvoxaMINE (LUVOX) 50 mg tablet Take 50 mg by mouth nightly.     • levothyroxine (SYNTHROID) 100 mcg tablet Take 100 mcg by mouth daily.     • liothyronine 5 mcg tablet Take 1 tablet (5 mcg total) by mouth daily. 30 tablet 0   • norethindrone-e.estradiol-iron (JUNEL FE 24 ORAL) Take by mouth.     • propranolol LA (INDERAL LA) 60 mg 24 hr capsule Take 1 capsule (60 mg total) by mouth daily. 30 capsule 3   • nystatin-triamcinolone ointment Apply thin layer twice daily for one week then once daily for one week but stop at anytime the lesions/rash resolves.  Less is more (Patient not taking: Reported on 3/15/2022 ) 60 g 0   • vortioxetine (TRINTELLIX) 10 mg tablet Take 10 mg by mouth daily.       No current facility-administered medications for this visit.         Review of Systems:  Review of Systems    Constitutional: Negative for chills, fever and weight loss.   HENT: Negative for ear pain, postnasal drip, rhinorrhea and sore throat.    Respiratory: Positive for cough and shortness of breath (only going up and down stairs which is 10 times per day ). Negative for hemoptysis and wheezing.    Cardiovascular: Negative for chest pain.   Gastrointestinal: Negative for heartburn.   Musculoskeletal: Negative for myalgias.   Neurological: Negative for headaches.       Objective     Vital Signs:  Vitals:    03/15/22 1505   BP: 116/80   Pulse: 74   Temp: 36.6 °C (97.9 °F)   SpO2: 98%       BMI:  Body mass index is 44.06 kg/m².     Physical Exam  Constitutional:       Appearance: Normal appearance. She is not ill-appearing or diaphoretic.   HENT:      Head: Normocephalic and atraumatic.      Right Ear: Tympanic membrane normal.      Left Ear: Tympanic membrane normal.      Nose: No congestion or rhinorrhea.      Mouth/Throat:      Mouth: Mucous membranes are moist.      Pharynx: No posterior oropharyngeal erythema.   Eyes:      Extraocular Movements: Extraocular movements intact.      Pupils: Pupils are equal, round, and reactive to light.   Cardiovascular:      Rate and Rhythm: Normal rate and regular rhythm.      Pulses: Normal pulses.      Heart sounds: No murmur heard.  Pulmonary:      Breath sounds: Normal breath sounds.   Chest:      Chest wall: No tenderness.   Neurological:      Mental Status: She is alert.         Recent labs before today:     No results found for: WBC, HGB, HCT, PLT, CHOL, TRIG, HDL, LDLC, ALT, AST, NA, K, CL, CREATININE, BUN, CO2, TSH, PSA, INR, GLUC, HGBA1C, MICROALBUR    Patient Instructions   If chest xray negative I would try pepcid daily for 4 weeks to see if this helps cough    If cough continues let me know     Xray today     Stop smoking if you need help we have programs      Patient Education   Smoking Cessation, Tips for Success  If you are ready to quit smoking, congratulations! You  "have chosen to help yourself be healthier. Cigarettes bring nicotine, tar, carbon monoxide, and other irritants into your body. Your lungs, heart, and blood vessels will be able to work better without these poisons. There are many different ways to quit smoking. Nicotine gum, nicotine patches, a nicotine inhaler, or nicotine nasal spray can help with physical craving. Hypnosis, support groups, and medicines help break the habit of smoking.  WHAT THINGS CAN I DO TO MAKE QUITTING EASIER?   Here are some tips to help you quit for good:  · Pick a date when you will quit smoking completely. Tell all of your friends and family about your plan to quit on that date.  · Do not try to slowly cut down on the number of cigarettes you are smoking. Pick a quit date and quit smoking completely starting on that day.  · Throw away all cigarettes.    · Clean and remove all ashtrays from your home, work, and car.  · On a card, write down your reasons for quitting. Carry the card with you and read it when you get the urge to smoke.  · Cleanse your body of nicotine. Drink enough water and fluids to keep your urine clear or pale yellow. Do this after quitting to flush the nicotine from your body.  · Learn to predict your moods. Do not let a bad situation be your excuse to have a cigarette. Some situations in your life might tempt you into wanting a cigarette.  · Never have \"just one\" cigarette. It leads to wanting another and another. Remind yourself of your decision to quit.  · Change habits associated with smoking. If you smoked while driving or when feeling stressed, try other activities to replace smoking. Stand up when drinking your coffee. Brush your teeth after eating. Sit in a different chair when you read the paper. Avoid alcohol while trying to quit, and try to drink fewer caffeinated beverages. Alcohol and caffeine may urge you to smoke.  · Avoid foods and drinks that can trigger a desire to smoke, such as sugary or spicy " "foods and alcohol.  · Ask people who smoke not to smoke around you.  · Have something planned to do right after eating or having a cup of coffee. For example, plan to take a walk or exercise.  · Try a relaxation exercise to calm you down and decrease your stress. Remember, you may be tense and nervous for the first 2 weeks after you quit, but this will pass.  · Find new activities to keep your hands busy. Play with a pen, coin, or rubber band. Doodle or draw things on paper.  · Brush your teeth right after eating. This will help cut down on the craving for the taste of tobacco after meals. You can also try mouthwash.    · Use oral substitutes in place of cigarettes. Try using lemon drops, carrots, cinnamon sticks, or chewing gum. Keep them handy so they are available when you have the urge to smoke.  · When you have the urge to smoke, try deep breathing.  · Designate your home as a nonsmoking area.  · If you are a heavy smoker, ask your health care provider about a prescription for nicotine chewing gum. It can ease your withdrawal from nicotine.  · Reward yourself. Set aside the cigarette money you save and buy yourself something nice.  · Look for support from others. Join a support group or smoking cessation program. Ask someone at home or at work to help you with your plan to quit smoking.  · Always ask yourself, \"Do I need this cigarette or is this just a reflex?\" Tell yourself, \"Today, I choose not to smoke,\" or \"I do not want to smoke.\" You are reminding yourself of your decision to quit.  · Do not replace cigarette smoking with electronic cigarettes (commonly called e-cigarettes). The safety of e-cigarettes is unknown, and some may contain harmful chemicals.  · If you relapse, do not give up! Plan ahead and think about what you will do the next time you get the urge to smoke.  HOW WILL I FEEL WHEN I QUIT SMOKING?  You may have symptoms of withdrawal because your body is used to nicotine (the addictive " substance in cigarettes). You may crave cigarettes, be irritable, feel very hungry, cough often, get headaches, or have difficulty concentrating. The withdrawal symptoms are only temporary. They are strongest when you first quit but will go away within 10-14 days. When withdrawal symptoms occur, stay in control. Think about your reasons for quitting. Remind yourself that these are signs that your body is healing and getting used to being without cigarettes. Remember that withdrawal symptoms are easier to treat than the major diseases that smoking can cause.   Even after the withdrawal is over, expect periodic urges to smoke. However, these cravings are generally short lived and will go away whether you smoke or not. Do not smoke!  WHAT RESOURCES ARE AVAILABLE TO HELP ME QUIT SMOKING?  Your health care provider can direct you to community resources or hospitals for support, which may include:  · Group support.  · Education.  · Hypnosis.  · Therapy.     This information is not intended to replace advice given to you by your health care provider. Make sure you discuss any questions you have with your health care provider.     Document Released: 09/15/2005 Document Revised: 01/08/2016 Document Reviewed: 06/05/2014  [employer] Patient Education ©2016 [employer]         If you do not hear from me regarding results in 7-10 days either via a call or the portal please call.      Problem List Items Addressed This Visit     None      Visit Diagnoses     Cough    -  Primary    Relevant Orders    X-RAY CHEST 2 VIEWS    Smoker        Relevant Orders    X-RAY CHEST 2 VIEWS      No signs of infection     Collaborates cough started in February not Dec as she recalls it was two weeks prior to telemed and ED appt following     Smoking cessation counseling given              ADAM Darden  3/15/2022

## 2022-03-15 NOTE — PATIENT INSTRUCTIONS
"If chest xray negative I would try pepcid daily for 4 weeks to see if this helps cough    If cough continues let me know     Xray today     Stop smoking if you need help we have programs      Patient Education   Smoking Cessation, Tips for Success  If you are ready to quit smoking, congratulations! You have chosen to help yourself be healthier. Cigarettes bring nicotine, tar, carbon monoxide, and other irritants into your body. Your lungs, heart, and blood vessels will be able to work better without these poisons. There are many different ways to quit smoking. Nicotine gum, nicotine patches, a nicotine inhaler, or nicotine nasal spray can help with physical craving. Hypnosis, support groups, and medicines help break the habit of smoking.  WHAT THINGS CAN I DO TO MAKE QUITTING EASIER?   Here are some tips to help you quit for good:  · Pick a date when you will quit smoking completely. Tell all of your friends and family about your plan to quit on that date.  · Do not try to slowly cut down on the number of cigarettes you are smoking. Pick a quit date and quit smoking completely starting on that day.  · Throw away all cigarettes.    · Clean and remove all ashtrays from your home, work, and car.  · On a card, write down your reasons for quitting. Carry the card with you and read it when you get the urge to smoke.  · Cleanse your body of nicotine. Drink enough water and fluids to keep your urine clear or pale yellow. Do this after quitting to flush the nicotine from your body.  · Learn to predict your moods. Do not let a bad situation be your excuse to have a cigarette. Some situations in your life might tempt you into wanting a cigarette.  · Never have \"just one\" cigarette. It leads to wanting another and another. Remind yourself of your decision to quit.  · Change habits associated with smoking. If you smoked while driving or when feeling stressed, try other activities to replace smoking. Stand up when drinking your " "coffee. Brush your teeth after eating. Sit in a different chair when you read the paper. Avoid alcohol while trying to quit, and try to drink fewer caffeinated beverages. Alcohol and caffeine may urge you to smoke.  · Avoid foods and drinks that can trigger a desire to smoke, such as sugary or spicy foods and alcohol.  · Ask people who smoke not to smoke around you.  · Have something planned to do right after eating or having a cup of coffee. For example, plan to take a walk or exercise.  · Try a relaxation exercise to calm you down and decrease your stress. Remember, you may be tense and nervous for the first 2 weeks after you quit, but this will pass.  · Find new activities to keep your hands busy. Play with a pen, coin, or rubber band. Doodle or draw things on paper.  · Brush your teeth right after eating. This will help cut down on the craving for the taste of tobacco after meals. You can also try mouthwash.    · Use oral substitutes in place of cigarettes. Try using lemon drops, carrots, cinnamon sticks, or chewing gum. Keep them handy so they are available when you have the urge to smoke.  · When you have the urge to smoke, try deep breathing.  · Designate your home as a nonsmoking area.  · If you are a heavy smoker, ask your health care provider about a prescription for nicotine chewing gum. It can ease your withdrawal from nicotine.  · Reward yourself. Set aside the cigarette money you save and buy yourself something nice.  · Look for support from others. Join a support group or smoking cessation program. Ask someone at home or at work to help you with your plan to quit smoking.  · Always ask yourself, \"Do I need this cigarette or is this just a reflex?\" Tell yourself, \"Today, I choose not to smoke,\" or \"I do not want to smoke.\" You are reminding yourself of your decision to quit.  · Do not replace cigarette smoking with electronic cigarettes (commonly called e-cigarettes). The safety of e-cigarettes is " unknown, and some may contain harmful chemicals.  · If you relapse, do not give up! Plan ahead and think about what you will do the next time you get the urge to smoke.  HOW WILL I FEEL WHEN I QUIT SMOKING?  You may have symptoms of withdrawal because your body is used to nicotine (the addictive substance in cigarettes). You may crave cigarettes, be irritable, feel very hungry, cough often, get headaches, or have difficulty concentrating. The withdrawal symptoms are only temporary. They are strongest when you first quit but will go away within 10-14 days. When withdrawal symptoms occur, stay in control. Think about your reasons for quitting. Remind yourself that these are signs that your body is healing and getting used to being without cigarettes. Remember that withdrawal symptoms are easier to treat than the major diseases that smoking can cause.   Even after the withdrawal is over, expect periodic urges to smoke. However, these cravings are generally short lived and will go away whether you smoke or not. Do not smoke!  WHAT RESOURCES ARE AVAILABLE TO HELP ME QUIT SMOKING?  Your health care provider can direct you to community resources or hospitals for support, which may include:  · Group support.  · Education.  · Hypnosis.  · Therapy.     This information is not intended to replace advice given to you by your health care provider. Make sure you discuss any questions you have with your health care provider.     Document Released: 09/15/2005 Document Revised: 01/08/2016 Document Reviewed: 06/05/2014  [employer] Patient Education ©2016 [employer]

## 2022-05-23 DIAGNOSIS — F41.1 GENERALIZED ANXIETY DISORDER: ICD-10-CM

## 2022-05-24 RX ORDER — PROPRANOLOL HYDROCHLORIDE 60 MG/1
CAPSULE, EXTENDED RELEASE ORAL
Qty: 90 CAPSULE | Refills: 1 | Status: SHIPPED | OUTPATIENT
Start: 2022-05-24 | End: 2022-06-29

## 2022-06-06 ENCOUNTER — OFFICE VISIT (OUTPATIENT)
Dept: FAMILY MEDICINE | Facility: CLINIC | Age: 27
End: 2022-06-06
Payer: MEDICARE

## 2022-06-06 VITALS
TEMPERATURE: 98 F | HEIGHT: 66 IN | WEIGHT: 272 LBS | OXYGEN SATURATION: 98 % | DIASTOLIC BLOOD PRESSURE: 74 MMHG | BODY MASS INDEX: 43.71 KG/M2 | HEART RATE: 78 BPM | SYSTOLIC BLOOD PRESSURE: 118 MMHG

## 2022-06-06 DIAGNOSIS — Z00.00 ROUTINE ADULT HEALTH MAINTENANCE: Primary | ICD-10-CM

## 2022-06-06 PROCEDURE — 99213 OFFICE O/P EST LOW 20 MIN: CPT | Performed by: NURSE PRACTITIONER

## 2022-06-06 RX ORDER — FLUOXETINE HYDROCHLORIDE 20 MG/1
10 CAPSULE ORAL DAILY
COMMUNITY
End: 2022-06-06

## 2022-06-06 RX ORDER — FLUOXETINE 10 MG/1
10 CAPSULE ORAL DAILY
COMMUNITY
End: 2022-10-11 | Stop reason: ALTCHOICE

## 2022-06-06 RX ORDER — PANTOPRAZOLE SODIUM 40 MG/1
40 TABLET, DELAYED RELEASE ORAL DAILY
Qty: 90 TABLET | Refills: 1 | Status: SHIPPED | OUTPATIENT
Start: 2022-06-06 | End: 2022-10-11

## 2022-06-06 ASSESSMENT — ENCOUNTER SYMPTOMS
HEADACHES: 0
FATIGUE: 0
ABDOMINAL PAIN: 0
FEVER: 0
SHORTNESS OF BREATH: 0
VOMITING: 0
NAUSEA: 1
CHILLS: 0
LIGHT-HEADEDNESS: 0
COUGH: 1
WHEEZING: 0

## 2022-06-06 NOTE — PATIENT INSTRUCTIONS
Patient presents for drivers license physical. No current complaints at this time, med list corrected. Pantoprazole for GERD or possible gastritis history.  Follow up as needed.

## 2022-06-06 NOTE — PROGRESS NOTES
[facility]  [address] [place] [address]  [phone]     Reason for visit:   Chief Complaint   Patient presents with   • Annual Exam     Drivers permit form      HPI   Geneva Roman is a 26 y.o. female who presents for a routine well visit.        Employed- on disability for mental illness  Fun activities -  Ever feel down/depressed -   SI/HI -  Diet -   Doesn't smoke, no smokers at home.  Screen time -   Alcohol -  Drugs -  Menses -   Sleep -   Last dental visit -  Ophthalmology -  Dermatology -  Seatbelt use -  Sexually active -   Mammogram -  Colonoscopy -  Cervical Cancer Screening -    Social History:  Social History     Social History Narrative    Moved from [place] for [facility] met on insta        Enjoys gardening, wood burning, rose and painting         On disability x 3 years but would like to return to work            Medical History:  Past Medical History:   Diagnosis Date   • ADHD    • Decreased libido    • Depression    • Endometriosis    • Heartburn    • Hyponatremia    • Plantar fasciitis    • Snoring    • Thyroid disease        Surgical History:  Past Surgical History:   Procedure Laterality Date   • LAPAROSCOPIC ENDOMETRIOSIS FULGURATION      2016 AND 2018         Family History:  Family History   Problem Relation Age of Onset   • Thyroid disease Biological Mother    • Mental illness Biological Mother    • Thyroid disease Biological Father    • Mental illness Biological Father    • Depression Biological Sister    • Depression Biological Brother    • No Known Problems Maternal Grandmother    • Colon cancer Maternal Grandfather    • No Known Problems Paternal Grandmother    • No Known Problems Paternal Grandfather    • Depression Biological Sister    • Depression Biological Sister        Allergies:  Doxycycline    Current Medications:  Current Outpatient Medications   Medication Sig Dispense Refill   • cholecalciferol, vitamin D3, 50 mcg (2,000 unit) tablet Take  2,000 Units by mouth daily. 90 tablet 1   • FLUoxetine (PROzac) 10 mg capsule Take 10 mg by mouth daily.     • fluvoxaMINE (LUVOX) 50 mg tablet Take 50 mg by mouth nightly.     • levothyroxine (SYNTHROID) 100 mcg tablet Take 100 mcg by mouth daily.     • liothyronine 5 mcg tablet Take 1 tablet (5 mcg total) by mouth daily. 30 tablet 0   • norethindrone-e.estradiol-iron (JUNEL FE 24 ORAL) Take by mouth.     • pantoprazole (PROTONIX) 40 mg EC tablet Take 1 tablet (40 mg total) by mouth daily. 90 tablet 1   • propranolol LA (INDERAL LA) 60 mg 24 hr capsule TAKE 1 CAPSULE BY MOUTH EVERY DAY 90 capsule 1   • nystatin-triamcinolone ointment Apply thin layer twice daily for one week then once daily for one week but stop at anytime the lesions/rash resolves.  Less is more (Patient not taking: No sig reported) 60 g 0     No current facility-administered medications for this visit.       Review of Systems:  Review of Systems   Constitutional: Negative for chills, fatigue and fever.   Respiratory: Positive for cough (allergies). Negative for shortness of breath and wheezing.    Cardiovascular: Negative for chest pain.   Gastrointestinal: Positive for nausea (with gastric pain, pt was dx with an ulcer in [place] in 2020). Negative for abdominal pain and vomiting.   Neurological: Negative for light-headedness and headaches.       Objective     Vital Signs:  Vitals:    06/06/22 1449   BP: 118/74   Pulse: 78   Temp: 36.7 °C (98 °F)   SpO2: 98%       BMI:  Body mass index is 43.9 kg/m².     Physical Exam  Constitutional:       Appearance: Normal appearance.   HENT:      Head: Normocephalic and atraumatic.   Cardiovascular:      Rate and Rhythm: Normal rate and regular rhythm.      Heart sounds: Normal heart sounds.   Pulmonary:      Effort: Pulmonary effort is normal.      Breath sounds: Normal breath sounds.   Neurological:      General: No focal deficit present.      Mental Status: She is alert and oriented to person, place, and time.    Psychiatric:         Attention and Perception: Attention normal.         Behavior: Behavior is cooperative.         Recent labs before today:     No results found for: WBC, HGB, HCT, PLT, CHOL, TRIG, HDL, LDLDIRECT, ALT, AST, NA, K, CL, CREATININE, BUN, CO2, TSH, PSA, INR, HGBA1C, MICROALBUR     Procedures   Assessment     Patient Instructions   Patient presents for drivers license physical. No current complaints at this time, med list corrected. Pantoprazole for GERD or possible gastritis history.  Follow up as needed.       Problem List Items Addressed This Visit    None     Visit Diagnoses     Routine adult health maintenance    -  Primary                    Param Avilez, ANGEL, ADAM  6/6/2022    This document was created using Dragon dictation software.  There might be some typographical errors due to this technology.

## 2022-06-29 ENCOUNTER — APPOINTMENT (EMERGENCY)
Dept: RADIOLOGY | Facility: HOSPITAL | Age: 27
End: 2022-06-29
Payer: MEDICARE

## 2022-06-29 ENCOUNTER — TELEPHONE (OUTPATIENT)
Dept: FAMILY MEDICINE | Facility: CLINIC | Age: 27
End: 2022-06-29

## 2022-06-29 ENCOUNTER — HOSPITAL ENCOUNTER (EMERGENCY)
Facility: HOSPITAL | Age: 27
Discharge: HOME | End: 2022-06-30
Attending: EMERGENCY MEDICINE
Payer: MEDICARE

## 2022-06-29 VITALS
WEIGHT: 270 LBS | HEIGHT: 66 IN | DIASTOLIC BLOOD PRESSURE: 66 MMHG | RESPIRATION RATE: 18 BRPM | HEART RATE: 64 BPM | BODY MASS INDEX: 43.39 KG/M2 | SYSTOLIC BLOOD PRESSURE: 111 MMHG | TEMPERATURE: 98.7 F | OXYGEN SATURATION: 98 %

## 2022-06-29 DIAGNOSIS — R10.2 PELVIC PAIN: ICD-10-CM

## 2022-06-29 DIAGNOSIS — N39.0 URINARY TRACT INFECTION IN FEMALE: Primary | ICD-10-CM

## 2022-06-29 LAB
ALBUMIN SERPL-MCNC: 4.2 G/DL (ref 3.4–5)
ALP SERPL-CCNC: 100 IU/L (ref 35–126)
ALT SERPL-CCNC: 20 IU/L (ref 11–54)
ANION GAP SERPL CALC-SCNC: 10 MEQ/L (ref 3–15)
AST SERPL-CCNC: 25 IU/L (ref 15–41)
BACTERIA URNS QL MICRO: ABNORMAL /HPF
BASOPHILS # BLD: 0.06 K/UL (ref 0.01–0.1)
BASOPHILS NFR BLD: 0.7 %
BILIRUB SERPL-MCNC: 0.4 MG/DL (ref 0.3–1.2)
BILIRUB UR QL STRIP.AUTO: NEGATIVE MG/DL
BUN SERPL-MCNC: 9 MG/DL (ref 8–20)
CALCIUM SERPL-MCNC: 9.6 MG/DL (ref 8.9–10.3)
CHLORIDE SERPL-SCNC: 107 MEQ/L (ref 98–109)
CLARITY UR REFRACT.AUTO: ABNORMAL
CO2 SERPL-SCNC: 23 MEQ/L (ref 22–32)
COLOR UR AUTO: YELLOW
CREAT SERPL-MCNC: 1 MG/DL (ref 0.6–1.1)
DIFFERENTIAL METHOD BLD: ABNORMAL
EOSINOPHIL # BLD: 0.11 K/UL (ref 0.04–0.36)
EOSINOPHIL NFR BLD: 1.3 %
ERYTHROCYTE [DISTWIDTH] IN BLOOD BY AUTOMATED COUNT: 12.3 % (ref 11.7–14.4)
GFR SERPL CREATININE-BSD FRML MDRD: >60 ML/MIN/1.73M*2
GLUCOSE SERPL-MCNC: 88 MG/DL (ref 70–99)
GLUCOSE UR STRIP.AUTO-MCNC: NEGATIVE MG/DL
HCG UR QL: NEGATIVE
HCT VFR BLDCO AUTO: 46.3 % (ref 35–45)
HGB BLD-MCNC: 15.5 G/DL (ref 11.8–15.7)
HGB UR QL STRIP.AUTO: NEGATIVE
HYALINE CASTS #/AREA URNS LPF: ABNORMAL /LPF
IMM GRANULOCYTES # BLD AUTO: 0.06 K/UL (ref 0–0.08)
IMM GRANULOCYTES NFR BLD AUTO: 0.7 %
KETONES UR STRIP.AUTO-MCNC: NEGATIVE MG/DL
LEUKOCYTE ESTERASE UR QL STRIP.AUTO: 3
LYMPHOCYTES # BLD: 1.75 K/UL (ref 1.2–3.5)
LYMPHOCYTES NFR BLD: 21.3 %
MCH RBC QN AUTO: 31.7 PG (ref 28–33.2)
MCHC RBC AUTO-ENTMCNC: 33.5 G/DL (ref 32.2–35.5)
MCV RBC AUTO: 94.7 FL (ref 83–98)
MONOCYTES # BLD: 0.56 K/UL (ref 0.28–0.8)
MONOCYTES NFR BLD: 6.8 %
MUCOUS THREADS URNS QL MICRO: ABNORMAL /LPF
NEUTROPHILS # BLD: 5.66 K/UL (ref 1.7–7)
NEUTS SEG NFR BLD: 69.2 %
NITRITE UR QL STRIP.AUTO: NEGATIVE
NRBC BLD-RTO: 0 %
PDW BLD AUTO: 9.3 FL (ref 9.4–12.3)
PH UR STRIP.AUTO: 6.5 [PH]
PLATELET # BLD AUTO: 260 K/UL (ref 150–369)
POTASSIUM SERPL-SCNC: 3.8 MEQ/L (ref 3.6–5.1)
PROT SERPL-MCNC: 8.1 G/DL (ref 6–8.2)
PROT UR QL STRIP.AUTO: NEGATIVE
RBC # BLD AUTO: 4.89 M/UL (ref 3.93–5.22)
RBC #/AREA URNS HPF: ABNORMAL /HPF
SODIUM SERPL-SCNC: 140 MEQ/L (ref 136–144)
SP GR UR REFRACT.AUTO: 1.01
SQUAMOUS URNS QL MICRO: 3 /HPF
UROBILINOGEN UR STRIP-ACNC: 0.2 EU/DL
WBC # BLD AUTO: 8.2 K/UL (ref 3.8–10.5)
WBC #/AREA URNS HPF: ABNORMAL /HPF

## 2022-06-29 PROCEDURE — 96361 HYDRATE IV INFUSION ADD-ON: CPT

## 2022-06-29 PROCEDURE — 3E033GC INTRODUCTION OF OTHER THERAPEUTIC SUBSTANCE INTO PERIPHERAL VEIN, PERCUTANEOUS APPROACH: ICD-10-PCS | Performed by: EMERGENCY MEDICINE

## 2022-06-29 PROCEDURE — 76857 US EXAM PELVIC LIMITED: CPT

## 2022-06-29 PROCEDURE — 76830 TRANSVAGINAL US NON-OB: CPT

## 2022-06-29 PROCEDURE — 36415 COLL VENOUS BLD VENIPUNCTURE: CPT | Performed by: PHYSICIAN ASSISTANT

## 2022-06-29 PROCEDURE — 3E0333Z INTRODUCTION OF ANTI-INFLAMMATORY INTO PERIPHERAL VEIN, PERCUTANEOUS APPROACH: ICD-10-PCS | Performed by: EMERGENCY MEDICINE

## 2022-06-29 PROCEDURE — 81001 URINALYSIS AUTO W/SCOPE: CPT | Performed by: EMERGENCY MEDICINE

## 2022-06-29 PROCEDURE — 63600000 HC DRUGS/DETAIL CODE: Performed by: PHYSICIAN ASSISTANT

## 2022-06-29 PROCEDURE — 63700000 HC SELF-ADMINISTRABLE DRUG: Performed by: PHYSICIAN ASSISTANT

## 2022-06-29 PROCEDURE — 99284 EMERGENCY DEPT VISIT MOD MDM: CPT | Mod: 25

## 2022-06-29 PROCEDURE — 85025 COMPLETE CBC W/AUTO DIFF WBC: CPT | Performed by: PHYSICIAN ASSISTANT

## 2022-06-29 PROCEDURE — 96374 THER/PROPH/DIAG INJ IV PUSH: CPT

## 2022-06-29 PROCEDURE — 84703 CHORIONIC GONADOTROPIN ASSAY: CPT | Performed by: PHYSICIAN ASSISTANT

## 2022-06-29 PROCEDURE — 87086 URINE CULTURE/COLONY COUNT: CPT

## 2022-06-29 PROCEDURE — 81003 URINALYSIS AUTO W/O SCOPE: CPT

## 2022-06-29 PROCEDURE — 3E0337Z INTRODUCTION OF ELECTROLYTIC AND WATER BALANCE SUBSTANCE INTO PERIPHERAL VEIN, PERCUTANEOUS APPROACH: ICD-10-PCS | Performed by: EMERGENCY MEDICINE

## 2022-06-29 PROCEDURE — 87086 URINE CULTURE/COLONY COUNT: CPT | Performed by: EMERGENCY MEDICINE

## 2022-06-29 PROCEDURE — 93975 VASCULAR STUDY: CPT | Mod: 59

## 2022-06-29 PROCEDURE — 80053 COMPREHEN METABOLIC PANEL: CPT | Performed by: PHYSICIAN ASSISTANT

## 2022-06-29 PROCEDURE — 96375 TX/PRO/DX INJ NEW DRUG ADDON: CPT

## 2022-06-29 PROCEDURE — 25800000 HC PHARMACY IV SOLUTIONS: Performed by: PHYSICIAN ASSISTANT

## 2022-06-29 RX ORDER — ONDANSETRON HYDROCHLORIDE 2 MG/ML
4 INJECTION, SOLUTION INTRAVENOUS ONCE
Status: COMPLETED | OUTPATIENT
Start: 2022-06-29 | End: 2022-06-29

## 2022-06-29 RX ORDER — CEFUROXIME AXETIL 250 MG/1
500 TABLET ORAL ONCE
Status: COMPLETED | OUTPATIENT
Start: 2022-06-29 | End: 2022-06-29

## 2022-06-29 RX ORDER — ONDANSETRON 4 MG/1
4 TABLET, ORALLY DISINTEGRATING ORAL EVERY 8 HOURS PRN
Qty: 12 TABLET | Refills: 0 | Status: SHIPPED | OUTPATIENT
Start: 2022-06-29 | End: 2022-10-11

## 2022-06-29 RX ORDER — IBUPROFEN 600 MG/1
600 TABLET ORAL EVERY 6 HOURS PRN
Qty: 20 TABLET | Refills: 0 | Status: SHIPPED | OUTPATIENT
Start: 2022-06-29 | End: 2022-10-11

## 2022-06-29 RX ORDER — KETOROLAC TROMETHAMINE 15 MG/ML
15 INJECTION, SOLUTION INTRAMUSCULAR; INTRAVENOUS ONCE
Status: COMPLETED | OUTPATIENT
Start: 2022-06-29 | End: 2022-06-29

## 2022-06-29 RX ORDER — CEFUROXIME AXETIL 500 MG/1
500 TABLET ORAL 2 TIMES DAILY
Qty: 14 TABLET | Refills: 0 | Status: SHIPPED | OUTPATIENT
Start: 2022-06-29 | End: 2022-07-06

## 2022-06-29 RX ADMIN — SODIUM CHLORIDE 1000 ML: 9 INJECTION, SOLUTION INTRAVENOUS at 23:08

## 2022-06-29 RX ADMIN — CEFUROXIME AXETIL 500 MG: 250 TABLET ORAL at 23:09

## 2022-06-29 RX ADMIN — KETOROLAC TROMETHAMINE 15 MG: 15 INJECTION, SOLUTION INTRAMUSCULAR; INTRAVENOUS at 23:08

## 2022-06-29 RX ADMIN — ONDANSETRON 4 MG: 2 INJECTION INTRAMUSCULAR; INTRAVENOUS at 23:08

## 2022-06-29 NOTE — TELEPHONE ENCOUNTER
Endometrial cyst and she cannot see the gyn until 7/28/22. She is askin jadyn Raza would recommend she see someone else or have a CT or US of some sort to check into it prior to that visit. Pt doesn't want to wait until ate July for any testing.    Pls c/b for recommendation of where to go/who to see & schedule OV if needed.

## 2022-06-30 ASSESSMENT — ENCOUNTER SYMPTOMS
ABDOMINAL PAIN: 1
BACK PAIN: 1
DIARRHEA: 0
DYSURIA: 0
CONFUSION: 0
FEVER: 0
WEAKNESS: 0
NUMBNESS: 0
NAUSEA: 1
SHORTNESS OF BREATH: 0
VOMITING: 1

## 2022-06-30 NOTE — ED ATTESTATION NOTE
The patient was evaluated and managed by the physician assistant / nurse practitioner. Discussed complaint, exam and results with pa/np and agree with assessment and plan. I was available to see the pt at the request of the pa/np or pt request.      Donovan Rodriguez MD  06/30/22 0604

## 2022-06-30 NOTE — DISCHARGE INSTRUCTIONS
Rest.  Drink plenty of fluids.  Take ibuprofen as needed for pain. Take antibiotics as directed. Follow-up with your ob/gyn tomorrow.  Call for appointment.  Return to the emergency department if new or worsening symptoms develop.

## 2022-06-30 NOTE — ED PROVIDER NOTES
Emergency Medicine Note  HPI   HISTORY OF PRESENT ILLNESS     HPI     26-year-old female with history of endometriosis, thyroid disease, ADHD, depression presents with pelvic pain x2 weeks.  Pain localized across lower pelvis, left worse than right.  Pain is constant, worse with movement.  Patient with associated nausea, vomiting, back pain.  Denies fever or chills.  LMP 2 weeks ago at symptom onset.  Patient known to [facility].  She is currently on oral contraceptive.      Patient History   PAST HISTORY     Reviewed from Nursing Triage:         Past Medical History:   Diagnosis Date   • ADHD    • Decreased libido    • Depression    • Endometriosis    • Heartburn    • Hyponatremia    • Plantar fasciitis    • Snoring    • Thyroid disease        Past Surgical History:   Procedure Laterality Date   • LAPAROSCOPIC ENDOMETRIOSIS FULGURATION      2016 AND 2018       Family History   Problem Relation Age of Onset   • Thyroid disease Biological Mother    • Mental illness Biological Mother    • Thyroid disease Biological Father    • Mental illness Biological Father    • Depression Biological Sister    • Depression Biological Brother    • No Known Problems Maternal Grandmother    • Colon cancer Maternal Grandfather    • No Known Problems Paternal Grandmother    • No Known Problems Paternal Grandfather    • Depression Biological Sister    • Depression Biological Sister        Social History     Tobacco Use   • Smoking status: Current Every Day Smoker   • Smokeless tobacco: Never Used   Substance Use Topics   • Alcohol use: Never   • Drug use: Never         Review of Systems   REVIEW OF SYSTEMS     Review of Systems   Constitutional: Negative for fever.   HENT: Negative for congestion.    Eyes: Negative for visual disturbance.   Respiratory: Negative for shortness of breath.    Cardiovascular: Negative for chest pain.   Gastrointestinal: Positive for abdominal pain, nausea and vomiting. Negative for diarrhea.    Genitourinary: Positive for pelvic pain. Negative for dysuria and vaginal bleeding.   Musculoskeletal: Positive for back pain.   Skin: Negative for rash.   Neurological: Negative for weakness and numbness.   Psychiatric/Behavioral: Negative for confusion.         VITALS     ED Vitals    Date/Time Temp Pulse Resp BP SpO2 [facility]   06/29/22 2305 37.1 °C (98.7 °F) 64 18 111/66 98 % FFS   06/29/22 2133 37.3 °C (99.1 °F) 93 18 113/59 99 % FFS   06/29/22 1952 36 °C (96.8 °F) 98 16 141/88 96 % AG                       Physical Exam   PHYSICAL EXAM     Physical Exam  Vitals and nursing note reviewed.   Constitutional:       Appearance: She is well-developed.   HENT:      Head: Normocephalic and atraumatic.      Mouth/Throat:      Mouth: Mucous membranes are moist.   Eyes:      Extraocular Movements: Extraocular movements intact.   Cardiovascular:      Rate and Rhythm: Normal rate and regular rhythm.   Pulmonary:      Effort: Pulmonary effort is normal.      Breath sounds: Normal breath sounds.   Abdominal:      Palpations: Abdomen is soft. There is no mass.      Tenderness: There is abdominal tenderness (lower). There is no right CVA tenderness, left CVA tenderness, guarding or rebound.   Musculoskeletal:         General: Normal range of motion.      Cervical back: Neck supple.   Skin:     General: Skin is warm and dry.   Neurological:      Mental Status: She is alert and oriented to person, place, and time.   Psychiatric:         Mood and Affect: Mood normal.           PROCEDURES     Procedures     DATA     Results     Procedure Component Value Units Date/Time    Comprehensive metabolic panel [[id number]]  (Normal) Collected: 06/29/22 2144    Specimen: Blood, Venous Updated: 06/29/22 2225     Sodium 140 mEQ/L      Potassium 3.8 mEQ/L      Comment: Results obtained on plasma. Plasma Potassium values may be up to 0.4 mEQ/L less than serum values. The differences may be greater for patients with high platelet or white cell  counts.        Chloride 107 mEQ/L      CO2 23 mEQ/L      BUN 9 mg/dL      Creatinine 1.0 mg/dL      Glucose 88 mg/dL      Calcium 9.6 mg/dL      AST (SGOT) 25 IU/L      ALT (SGPT) 20 IU/L      Alkaline Phosphatase 100 IU/L      Total Protein 8.1 g/dL      Comment: Test performed on plasma which typically contains approximately 0.4 g/dL more protein than serum.        Albumin 4.2 g/dL      Bilirubin, Total 0.4 mg/dL      eGFR >60.0 mL/min/1.73m*2      Anion Gap 10 mEQ/L     BhCG, Serum, Qual [[id number]]  (Normal) Collected: 06/29/22 2144    Specimen: Blood, Venous Updated: 06/29/22 2218     Preg Test, Serum Negative    CBC and differential [[id number]]  (Abnormal) Collected: 06/29/22 2144    Specimen: Blood, Venous Updated: 06/29/22 2213     WBC 8.20 K/uL      RBC 4.89 M/uL      Hemoglobin 15.5 g/dL      Hematocrit 46.3 %      MCV 94.7 fL      MCH 31.7 pg      MCHC 33.5 g/dL      RDW 12.3 %      Platelets 260 K/uL      MPV 9.3 fL      Differential Type Auto     nRBC 0.0 %      Immature Granulocytes 0.7 %      Neutrophils 69.2 %      Lymphocytes 21.3 %      Monocytes 6.8 %      Eosinophils 1.3 %      Basophils 0.7 %      Immature Granulocytes, Absolute 0.06 K/uL      Neutrophils, Absolute 5.66 K/uL      Lymphocytes, Absolute 1.75 K/uL      Monocytes, Absolute 0.56 K/uL      Eosinophils, Absolute 0.11 K/uL      Basophils, Absolute 0.06 K/uL     Urinalysis with Reflex Culture [[id number]]  (Abnormal) Collected: 06/29/22 2033    Specimen: Urine, Clean Catch Updated: 06/29/22 2102    Narrative:      The following orders were created for panel order Urinalysis with Reflex Culture.  Procedure                               Abnormality         Status                     ---------                               -----------         ------                     UA Reflex to Culture (Ma...[[id number]]  Abnormal            Final result               UA Microscopic[[id number]]               Abnormal            Final result                  Please view results for these tests on the individual orders.    UA Microscopic [[id number]]  (Abnormal) Collected: 06/29/22 2033    Specimen: Urine, Clean Catch Updated: 06/29/22 2102     RBC, Urine 0 TO 4 /HPF      WBC, Urine 20 TO 35 /HPF      Squamous Epithelial +3 /hpf      Hyaline Cast 3 TO 9 /lpf      Bacteria, Urine Rare /HPF      MUCUSUA Rare /LPF     UA Reflex to Culture (Macroscopic) [[id number]]  (Abnormal) Collected: 06/29/22 2033    Specimen: Urine, Clean Catch Updated: 06/29/22 2050     Color, Urine Yellow     Clarity, Urine Cloudy     Specific Gravity, Urine 1.013     pH, Urine 6.5     Leukocyte Esterase +3     Nitrite, Urine Negative     Protein, Urine Negative     Glucose, Urine Negative mg/dL      Ketones, Urine Negative mg/dL      Urobilinogen, Urine 0.2 EU/dL      Bilirubin, Urine Negative mg/dL      Blood, Urine Negative     Comment: The sensitivity of the occult blood test is equivalent to approximately 4 intact RBC/HPF.             Imaging Results          ULTRASOUND PELVIS LIMITED TRANSABDOMINAL ONLY (Final result)  Result time 06/29/22 21:43:25    Final result                 Impression:    IMPRESSION: Limited study with nonvisualization of both ovaries.  1.  Lower uterine segment mild heterogeneous echogenicity/endometrial thickening  without flow which is nonspecific.  Follow-up with OB/GYN and ultrasound  suggested in six weeks time.  2.  Complex nabothian cysts.  3.  Small amount of nonspecific free fluid.                 Narrative:    CLINICAL HISTORY: Pain    TECHNIQUE:  Real time ultrasound of the pelvis was performed utilizing  transabdominal complete and transvaginal imaging for improved evaluation of the  endometrium and ovaries.  2D and color flow imaging was performed.    PRIOR STUDIES:  None    COMMENT:  The uterus measures 7.5 x 3.1 x 3.1 cm.  Nabothian cysts noted within  the lower uterine segment.    The endometrial stripe measures 10 mm.  There does appear to be a focal  area of  increased echogenicity within the lower uterus without significant increased  flow..    The ovaries are not seen.  No adnexal masses noted.    There is a small amount of fluid.    The endometrial stripe and ovaries are better seen utilizing transvaginal  technique.                               ULTRASOUND PELVIS TRANSVAGINAL ONLY (Final result)  Result time 06/29/22 21:43:25    Final result                 Impression:    IMPRESSION: Limited study with nonvisualization of both ovaries.  1.  Lower uterine segment mild heterogeneous echogenicity/endometrial thickening  without flow which is nonspecific.  Follow-up with OB/GYN and ultrasound  suggested in six weeks time.  2.  Complex nabothian cysts.  3.  Small amount of nonspecific free fluid.                 Narrative:    CLINICAL HISTORY: Pain    TECHNIQUE:  Real time ultrasound of the pelvis was performed utilizing  transabdominal complete and transvaginal imaging for improved evaluation of the  endometrium and ovaries.  2D and color flow imaging was performed.    PRIOR STUDIES:  None    COMMENT:  The uterus measures 7.5 x 3.1 x 3.1 cm.  Nabothian cysts noted within  the lower uterine segment.    The endometrial stripe measures 10 mm.  There does appear to be a focal area of  increased echogenicity within the lower uterus without significant increased  flow..    The ovaries are not seen.  No adnexal masses noted.    There is a small amount of fluid.    The endometrial stripe and ovaries are better seen utilizing transvaginal  technique.                               ULTRASOUND DOPPLER (Final result)  Result time 06/29/22 21:43:25    Final result                 Impression:    IMPRESSION: Limited study with nonvisualization of both ovaries.  1.  Lower uterine segment mild heterogeneous echogenicity/endometrial thickening  without flow which is nonspecific.  Follow-up with OB/GYN and ultrasound  suggested in six weeks time.  2.  Complex nabothian cysts.  3.   Small amount of nonspecific free fluid.                 Narrative:    CLINICAL HISTORY: Pain    TECHNIQUE:  Real time ultrasound of the pelvis was performed utilizing  transabdominal complete and transvaginal imaging for improved evaluation of the  endometrium and ovaries.  2D and color flow imaging was performed.    PRIOR STUDIES:  None    COMMENT:  The uterus measures 7.5 x 3.1 x 3.1 cm.  Nabothian cysts noted within  the lower uterine segment.    The endometrial stripe measures 10 mm.  There does appear to be a focal area of  increased echogenicity within the lower uterus without significant increased  flow..    The ovaries are not seen.  No adnexal masses noted.    There is a small amount of fluid.    The endometrial stripe and ovaries are better seen utilizing transvaginal  technique.                                No orders to display       Scoring tools                                 ED Course & MDM   MDM / ED COURSE / CLINICAL IMPRESSIONS / DISPO     MDM    Clinical Impressions as of 06/30/22 0020   Urinary tract infection in female   Pelvic pain     Discharge         Karla Davies PA C  06/30/22 0021

## 2022-07-01 LAB
BACTERIA UR CULT: NORMAL
BACTERIA UR CULT: NORMAL

## 2022-07-01 NOTE — TELEPHONE ENCOUNTER
It is not clear how she knows she has a cyst there would have been some type of imaging done can you call and get more info and then I can help guide

## 2022-10-11 ENCOUNTER — TELEPHONE (OUTPATIENT)
Dept: FAMILY MEDICINE | Facility: CLINIC | Age: 27
End: 2022-10-11

## 2022-10-11 ENCOUNTER — OFFICE VISIT (OUTPATIENT)
Dept: FAMILY MEDICINE | Facility: CLINIC | Age: 27
End: 2022-10-11
Payer: MEDICARE

## 2022-10-11 VITALS
TEMPERATURE: 98.2 F | RESPIRATION RATE: 16 BRPM | SYSTOLIC BLOOD PRESSURE: 102 MMHG | HEIGHT: 66 IN | DIASTOLIC BLOOD PRESSURE: 74 MMHG | OXYGEN SATURATION: 96 % | BODY MASS INDEX: 40.98 KG/M2 | WEIGHT: 255 LBS | HEART RATE: 84 BPM

## 2022-10-11 DIAGNOSIS — E03.9 ACQUIRED HYPOTHYROIDISM: ICD-10-CM

## 2022-10-11 DIAGNOSIS — R05.1 ACUTE COUGH: ICD-10-CM

## 2022-10-11 DIAGNOSIS — J01.00 ACUTE NON-RECURRENT MAXILLARY SINUSITIS: Primary | ICD-10-CM

## 2022-10-11 DIAGNOSIS — R09.82 PND (POST-NASAL DRIP): ICD-10-CM

## 2022-10-11 PROBLEM — J06.9 ACUTE URI: Status: ACTIVE | Noted: 2022-10-11

## 2022-10-11 PROCEDURE — 87637 SARSCOV2&INF A&B&RSV AMP PRB: CPT | Performed by: STUDENT IN AN ORGANIZED HEALTH CARE EDUCATION/TRAINING PROGRAM

## 2022-10-11 PROCEDURE — 99213 OFFICE O/P EST LOW 20 MIN: CPT | Performed by: STUDENT IN AN ORGANIZED HEALTH CARE EDUCATION/TRAINING PROGRAM

## 2022-10-11 RX ORDER — NORETHINDRONE ACETATE AND ETHINYL ESTRADIOL .02; 1 MG/1; MG/1
TABLET ORAL SEE ADMIN INSTRUCTIONS
COMMUNITY
End: 2022-10-11 | Stop reason: ALTCHOICE

## 2022-10-11 RX ORDER — LIOTHYRONINE SODIUM 5 UG/1
5 TABLET ORAL DAILY
Qty: 30 TABLET | Refills: 0 | Status: SHIPPED | OUTPATIENT
Start: 2022-10-11 | End: 2022-11-08

## 2022-10-11 RX ORDER — AZITHROMYCIN 250 MG/1
TABLET, FILM COATED ORAL
Qty: 6 TABLET | Refills: 0 | Status: SHIPPED | OUTPATIENT
Start: 2022-10-11 | End: 2022-12-08

## 2022-10-11 RX ORDER — BENZONATATE 100 MG/1
100 CAPSULE ORAL 3 TIMES DAILY PRN
Qty: 30 CAPSULE | Refills: 0 | Status: SHIPPED | OUTPATIENT
Start: 2022-10-11 | End: 2022-10-21

## 2022-10-11 RX ORDER — CARIPRAZINE 3 MG/1
CAPSULE, GELATIN COATED ORAL
COMMUNITY
Start: 2022-09-08 | End: 2022-11-22 | Stop reason: SDUPTHER

## 2022-10-11 RX ORDER — FLUTICASONE PROPIONATE 50 MCG
1 SPRAY, SUSPENSION (ML) NASAL DAILY
Qty: 16 G | Refills: 0 | Status: SHIPPED | OUTPATIENT
Start: 2022-10-11 | End: 2022-12-08

## 2022-10-11 RX ORDER — LEVOTHYROXINE SODIUM 100 UG/1
100 TABLET ORAL
Qty: 30 TABLET | Refills: 0 | Status: SHIPPED | OUTPATIENT
Start: 2022-10-11 | End: 2022-11-08

## 2022-10-11 ASSESSMENT — ENCOUNTER SYMPTOMS
CHILLS: 0
ACTIVITY CHANGE: 1
FEVER: 0
APPETITE CHANGE: 1
DYSURIA: 0
ABDOMINAL PAIN: 0
RHINORRHEA: 1
SORE THROAT: 0
WHEEZING: 1
DIARRHEA: 1
DIAPHORESIS: 1
VOMITING: 1
FATIGUE: 1
CHEST TIGHTNESS: 1
SHORTNESS OF BREATH: 0
NAUSEA: 1

## 2022-10-11 NOTE — ASSESSMENT & PLAN NOTE
Covid and flu swab pending  Prescribed Zpac and instructed on proper use.   Prescribed Tessalon Perles as needed for cough  Prescribed Flonase 1-2 times daily for postnasal drip  Maintain adequate oral hydration and nutrition  Recommend to supplement with electrolyte enriched drinks if not eating and get plenty of rest   Tylenol/Ibuprofen as needed for fevers  Humidifier in room to help with congestion  Honey as needed for cough   If symptoms worsen/persist, RTO or call

## 2022-10-11 NOTE — PROGRESS NOTES
"Chief Complaint   Patient presents with    Cough     Cough for 2 weeks, now feels like the flu, never had covid before  Feels like shes wheezing      Med Management     Need refill of synthroid        Subjective      Patient ID: Geneva Roman is a 27 y.o. female.  1995      HPI    Symptoms: cough, runny nose, fever, PND,  N/V, chest congestion  Started 2 weeks ago and has been worsening since then.  Sick contacts: 2 weeks ago was working at [employer] and one of the kids coughed in her mouth  Recent illnesses: denies  Characteristics: productive cough   Relieving factors: denies  Medication allergies: doxycyline   Feels very tired, states that she feels that she has a constant drip in the back of her throat and therefore gets nauseous. She is having trouble eating full meals because of this. This started 5 days ago.  She has not tested herself for COVID at home.    Pt states that Azithro is one of the only Abx she can tolerate as she feels that she gets allergic reactions/rashes with several others.     The following have been reviewed and updated as appropriate in this visit:   Allergies  Meds  Problems       Review of Systems   Constitutional: Positive for activity change, appetite change, diaphoresis and fatigue. Negative for chills and fever.   HENT: Positive for congestion, ear pain, postnasal drip and rhinorrhea. Negative for ear discharge and sore throat.    Respiratory: Positive for chest tightness and wheezing. Negative for shortness of breath.    Cardiovascular: Negative for chest pain.   Gastrointestinal: Positive for diarrhea, nausea and vomiting. Negative for abdominal pain.   Genitourinary: Negative for dysuria.       Objective     Vitals:    10/11/22 1343   BP: 102/74   Pulse: 84   Resp: 16   Temp: 36.8 °C (98.2 °F)   TempSrc: Temporal   SpO2: 96%   Weight: 116 kg (255 lb)   Height: 1.676 m (5' 6\")     Body mass index is 41.16 kg/m².    Physical Exam  Vitals and nursing note reviewed. "   HENT:      Right Ear: No tenderness. No middle ear effusion. There is no impacted cerumen. Tympanic membrane is not bulging.      Left Ear: No tenderness. A middle ear effusion is present. There is no impacted cerumen. Tympanic membrane is not bulging.      Nose: Rhinorrhea present.      Right Turbinates: Swollen.      Left Turbinates: Swollen.      Right Sinus: Maxillary sinus tenderness present. No frontal sinus tenderness.      Left Sinus: Maxillary sinus tenderness present. No frontal sinus tenderness.      Mouth/Throat:      Mouth: Mucous membranes are moist.      Pharynx: Posterior oropharyngeal erythema present. No oropharyngeal exudate.      Comments: Postnasal drip  Eyes:      General:         Right eye: No discharge.         Left eye: No discharge.   Cardiovascular:      Rate and Rhythm: Normal rate and regular rhythm.      Heart sounds: Normal heart sounds.   Pulmonary:      Effort: Pulmonary effort is normal. No respiratory distress.      Breath sounds: Normal breath sounds. No wheezing or rales.   Abdominal:      General: Bowel sounds are normal.      Tenderness: There is no abdominal tenderness. There is no guarding or rebound.   Musculoskeletal:      Cervical back: No rigidity.   Lymphadenopathy:      Cervical: Cervical adenopathy present.   Skin:     Findings: No rash.   Neurological:      Mental Status: She is alert.         Assessment/Plan   Diagnoses and all orders for this visit:    Acute non-recurrent maxillary sinusitis (Primary)  Assessment & Plan:  Covid and flu swab pending  Prescribed Zpac and instructed on proper use.   Prescribed Tessalon Perles as needed for cough  Prescribed Flonase 1-2 times daily for postnasal drip  Maintain adequate oral hydration and nutrition  Recommend to supplement with electrolyte enriched drinks if not eating and get plenty of rest   Tylenol/Ibuprofen as needed for fevers  Humidifier in room to help with congestion  Honey as needed for cough   If symptoms  worsen/persist, RTO or call            Orders:  -     azithromycin (ZITHROMAX) 250 mg tablet; Take 2 tablets the first day, then 1 tablet daily for 4 days.    Acute cough  -     COVID- 19 PCR Symptomatic (includes FLU A/B & RSV) - [facility]; Future  -     benzonatate (TESSALON PERLES) 100 mg capsule; Take 1 capsule (100 mg total) by mouth 3 (three) times a day as needed for cough for up to 10 days.    PND (post-nasal drip)  -     fluticasone propionate (FLONASE) 50 mcg/actuation nasal spray; Administer 1 spray into each nostril daily.    Acquired hypothyroidism  -     levothyroxine (SYNTHROID) 100 mcg tablet; Take 1 tablet (100 mcg total) by mouth daily.  -     liothyronine (CYTOMEL) 5 mcg tablet; Take 1 tablet (5 mcg total) by mouth daily.  States that her PCP has her taking both the medications for her Hypothyroidism and she needs a refill.     Return if symptoms worsen or fail to improve.

## 2022-10-11 NOTE — TELEPHONE ENCOUNTER
Pt called and is presenting congestion, cough and vomiting. Pt states it has been going on for a week. Pt is not sure if she may have bronchitis or COVID. Pt denies taking COVID test. Please can someone reach out?

## 2022-10-12 LAB
FLUAV RNA SPEC QL NAA+PROBE: NEGATIVE
FLUBV RNA SPEC QL NAA+PROBE: NEGATIVE
RSV RNA SPEC QL NAA+PROBE: NEGATIVE
SARS-COV-2 RNA RESP QL NAA+PROBE: NEGATIVE

## 2022-11-08 ENCOUNTER — TELEPHONE (OUTPATIENT)
Dept: FAMILY MEDICINE | Facility: CLINIC | Age: 27
End: 2022-11-08

## 2022-11-08 DIAGNOSIS — E03.9 ACQUIRED HYPOTHYROIDISM: Primary | ICD-10-CM

## 2022-11-08 NOTE — TELEPHONE ENCOUNTER
Labs well overdue ordered in Sept 2021 pending     Will reorder but no other refills without labs please also set up routine visit as it has been some time since I have seen her      Diagnosis Plan   1. Acquired hypothyroidism  TSH    T4, free    T3, free

## 2022-11-21 ENCOUNTER — TELEPHONE (OUTPATIENT)
Dept: FAMILY MEDICINE | Facility: CLINIC | Age: 27
End: 2022-11-21
Payer: MEDICARE

## 2022-11-21 DIAGNOSIS — F33.1 MODERATE EPISODE OF RECURRENT MAJOR DEPRESSIVE DISORDER (CMS/HCC): ICD-10-CM

## 2022-11-21 DIAGNOSIS — F43.10 PTSD (POST-TRAUMATIC STRESS DISORDER): Primary | ICD-10-CM

## 2022-11-21 DIAGNOSIS — F31.9 BIPOLAR 1 DISORDER (CMS/HCC): ICD-10-CM

## 2022-11-21 RX ORDER — FLUVOXAMINE MALEATE 50 MG/1
50 TABLET, FILM COATED ORAL NIGHTLY
Status: CANCELLED | OUTPATIENT
Start: 2022-11-21

## 2022-11-21 RX ORDER — CARIPRAZINE 3 MG/1
1 CAPSULE, GELATIN COATED ORAL EVERY MORNING
Status: CANCELLED | OUTPATIENT
Start: 2022-11-21

## 2022-11-21 NOTE — TELEPHONE ENCOUNTER
This is above max dosing for cymbalta.  Max is 120 mg per day    I know her well is she not taking vraylar?    Original says luvox refill?  Luvox max is 300 mg per day- did you mean luvox or fluvozamine?      What is the full medication regimen she is on?      Does she need just luvox refill or is she on cymbalta

## 2022-11-21 NOTE — TELEPHONE ENCOUNTER
Pt is requesting fluvoxamine 100 mg BID.  She is also requesting Vraylar 3 mg once daily    Not requesting or taking cymbalta

## 2022-11-21 NOTE — TELEPHONE ENCOUNTER
Chart review and medication has been prescribed by ADAM Moncada. Last filled 100 mg #60 on 10/3/22. Spoke with pt and states that provider will not provide refill as she is going out on a sabbatical. States she is not aware of another provider in that practice that is taking over. States the email she received was very vague. She is trying to find a new psychiatrist and is asking if you will fill medication until she can establish with new provider. She is currently taking duloxetine 100 mg BID. Please advise. She has appt with you on 12/8/22

## 2022-11-21 NOTE — TELEPHONE ENCOUNTER
Patient called stating she is completely out of fluvoxamine 50mg. She states that she needs a refill, but it should be 100mg 2x daily. She has an appointment with Luz Marina on 12/8/2022. She requesting a refill to get her thru to her appointment. Please advise.    Pharmacy is confirmed to be [facility].    Patient can be reached at [phone].    Thank you!  Kristin

## 2022-11-22 RX ORDER — FLUVOXAMINE MALEATE 50 MG/1
100 TABLET, FILM COATED ORAL 2 TIMES DAILY
Qty: 120 TABLET | Refills: 0 | Status: SHIPPED | OUTPATIENT
Start: 2022-11-22 | End: 2022-12-08 | Stop reason: SDUPTHER

## 2022-11-22 RX ORDER — CARIPRAZINE 3 MG/1
1 CAPSULE, GELATIN COATED ORAL EVERY MORNING
Qty: 30 CAPSULE | Refills: 0 | Status: SHIPPED | OUTPATIENT
Start: 2022-11-22 | End: 2022-12-08

## 2022-11-22 NOTE — TELEPHONE ENCOUNTER
Diagnosis Plan   1. PTSD (post-traumatic stress disorder)        2. Bipolar 1 disorder (CMS/HCC)  VRAYLAR 3 mg capsule      3. Moderate episode of recurrent major depressive disorder (CMS/HCC)  fluvoxaMINE (LUVOX) 50 mg tablet

## 2022-12-07 DIAGNOSIS — R09.82 PND (POST-NASAL DRIP): ICD-10-CM

## 2022-12-08 ENCOUNTER — OFFICE VISIT (OUTPATIENT)
Dept: FAMILY MEDICINE | Facility: CLINIC | Age: 27
End: 2022-12-08
Payer: MEDICARE

## 2022-12-08 VITALS
HEIGHT: 66 IN | WEIGHT: 246 LBS | TEMPERATURE: 98 F | DIASTOLIC BLOOD PRESSURE: 70 MMHG | HEART RATE: 75 BPM | SYSTOLIC BLOOD PRESSURE: 110 MMHG | OXYGEN SATURATION: 98 % | BODY MASS INDEX: 39.53 KG/M2

## 2022-12-08 DIAGNOSIS — Z13.220 SCREENING FOR LIPID DISORDERS: ICD-10-CM

## 2022-12-08 DIAGNOSIS — R71.8 ELEVATED HEMATOCRIT: ICD-10-CM

## 2022-12-08 DIAGNOSIS — Z13.1 SCREENING FOR DIABETES MELLITUS: ICD-10-CM

## 2022-12-08 DIAGNOSIS — Z23 NEED FOR VACCINATION: Primary | ICD-10-CM

## 2022-12-08 DIAGNOSIS — F33.1 MODERATE EPISODE OF RECURRENT MAJOR DEPRESSIVE DISORDER (CMS/HCC): ICD-10-CM

## 2022-12-08 PROCEDURE — 90686 IIV4 VACC NO PRSV 0.5 ML IM: CPT | Performed by: NURSE PRACTITIONER

## 2022-12-08 PROCEDURE — 99215 OFFICE O/P EST HI 40 MIN: CPT | Mod: 25 | Performed by: NURSE PRACTITIONER

## 2022-12-08 PROCEDURE — G0008 ADMIN INFLUENZA VIRUS VAC: HCPCS | Performed by: NURSE PRACTITIONER

## 2022-12-08 RX ORDER — PROPRANOLOL HYDROCHLORIDE 10 MG/1
10 TABLET ORAL 3 TIMES DAILY PRN
Qty: 90 TABLET | Refills: 0 | Status: SHIPPED | OUTPATIENT
Start: 2022-12-08 | End: 2022-12-27

## 2022-12-08 RX ORDER — FLUVOXAMINE MALEATE 50 MG/1
100 TABLET, FILM COATED ORAL 2 TIMES DAILY
Qty: 120 TABLET | Refills: 0 | Status: SHIPPED | OUTPATIENT
Start: 2022-12-08 | End: 2023-01-09

## 2022-12-08 RX ORDER — BUSPIRONE HYDROCHLORIDE 5 MG/1
5 TABLET ORAL 2 TIMES DAILY
Qty: 60 TABLET | Refills: 0 | Status: SHIPPED | OUTPATIENT
Start: 2022-12-08 | End: 2023-01-17

## 2022-12-08 RX ORDER — FLUTICASONE PROPIONATE 50 MCG
SPRAY, SUSPENSION (ML) NASAL
Qty: 16 ML | OUTPATIENT
Start: 2022-12-08

## 2022-12-08 ASSESSMENT — ENCOUNTER SYMPTOMS
DYSPHORIC MOOD: 1
PALPITATIONS: 1
RESPIRATORY NEGATIVE: 1
CONFUSION: 0
UNEXPECTED WEIGHT CHANGE: 1
DECREASED CONCENTRATION: 1
FATIGUE: 1
NERVOUS/ANXIOUS: 1

## 2022-12-08 ASSESSMENT — PATIENT HEALTH QUESTIONNAIRE - PHQ9
SUM OF ALL RESPONSES TO PHQ QUESTIONS 1-9: 12
SUM OF ALL RESPONSES TO PHQ9 QUESTIONS 1 & 2: 3

## 2022-12-08 NOTE — PROGRESS NOTES
Reason for visit:   Chief Complaint   Patient presents with   • Follow-up       HPI  is a 27 y.o. female     HPI    Last seen with me for an acute issue.      She is having trouble getting into psych -scheduled for February with Dr David Lopez due to insurance     Would like thyroid checked -stopped thyroid medications about a month ago  Lost medications which were filled with my colleague October   Has not taken medication in 3 weeks     Some heart palpitations   Anxiety is high   BF here and supportive   Working to get into psych   Vistaril brings her down too much       Was on buspar in the past and this worked well for her but ran out   Luvox for some time works well  Did not tolerate lamictal     Living with BF I Mike     PHQ 2 to 9:  Will the patient answer the depression questions?: Y    Little interest or pleasure in doing things: Over half    Feeling down, depressed, or hopeless: Several days    Depression Risk: 3    Trouble falling or staying asleep, or sleeping too much: Almost all    Feeling tired or having little energy: Several days    Poor appetite or overeating: Several days    Feeling bad about yourself - or that you are a failure or have let yourself or your family down: Several days    Trouble concentrating on things, such as reading the newspaper or watching television: Almost all    Moving or speaking so slowly that other people could have noticed? Or the opposite - being so fidgety or restless that you have been moving around a lot more than usual.: Not at all    Thoughts that you would be better off dead or hurting yourself in some way: Not at all    Depression Risk Score: 12    No data recorded  PHQ-9 interpretation: PHQ result may indicate moderate depression          ALIREZA-7  Feeling nervous, anxious or on edge: 3-->Nearly every day    Not being able to stop or control worrying: 3-->Nearly every day    Worrying too much about different things: 3-->Nearly every day    Trouble  relaxing: 3-->Nearly every day    Being so restless that it is hard to sit still: 3-->Nearly every day    Becoming easily annoyed or irritable: 2-->More than half the days    Feeling afraid as if something awful might happen: 3-->Nearly every day      GAD7 Total Score: : 20      No data recorded          Problem List:  Patient Active Problem List    Diagnosis Date Noted   • Acute non-recurrent maxillary sinusitis 10/11/2022   • Smoker 03/15/2022   • Raynaud's disease without gangrene 09/27/2021   • PTSD (post-traumatic stress disorder) 09/27/2021   • Moderate episode of recurrent major depressive disorder (CMS/HCC) 09/27/2021   • Bipolar 1 disorder (CMS/[facility]) 09/27/2021   • Anxiety 09/27/2021   • Acquired hypothyroidism 09/27/2021   • Endometriosis 09/27/2021   • History of idiopathic seizure 09/27/2021   • Attention deficit hyperactivity disorder (ADHD), predominantly inattentive type 09/27/2021   • Hyponatremia 09/27/2021   • Plantar fasciitis 09/27/2021   • Gastroesophageal reflux disease 09/27/2021   • Long-term use of high-risk medication 09/27/2021       Surgical History:  Past Surgical History:   Procedure Laterality Date   • LAPAROSCOPIC ENDOMETRIOSIS FULGURATION      2016 AND 2018       Social History:  Social History     Social History Narrative    Moved from [place] for fiance met on [facility]        Enjoys gardening, wood burning, rose and painting         On disability x 3 years but would like to return to work            Family History:  Family History   Problem Relation Age of Onset   • Thyroid disease Biological Mother    • Mental illness Biological Mother    • Thyroid disease Biological Father    • Mental illness Biological Father    • Depression Biological Sister    • Depression Biological Brother    • No Known Problems Maternal Grandmother    • Colon cancer Maternal Grandfather    • No Known Problems Paternal Grandmother    • No Known Problems Paternal Grandfather    • Depression Biological Sister    •  Depression Biological Sister        Allergies:  Doxycycline    Current Medications:  Current Outpatient Medications   Medication Sig Dispense Refill   • fluvoxaMINE (LUVOX) 50 mg tablet Take 2 tablets (100 mg total) by mouth 2 (two) times a day. 120 tablet 0   • azithromycin (ZITHROMAX) 250 mg tablet Take 2 tablets the first day, then 1 tablet daily for 4 days. (Patient not taking: Reported on 12/8/2022) 6 tablet 0   • fluticasone propionate (FLONASE) 50 mcg/actuation nasal spray Administer 1 spray into each nostril daily. (Patient not taking: Reported on 12/8/2022) 16 g 0   • levothyroxine (SYNTHROID) 100 mcg tablet TAKE 1 TABLET BY MOUTH EVERY DAY (Patient not taking: Reported on 12/8/2022) 30 tablet 0   • liothyronine (CYTOMEL) 5 mcg tablet TAKE 1 TABLET BY MOUTH EVERY DAY (Patient not taking: Reported on 12/8/2022) 30 tablet 0   • norethindrone-e.estradiol-iron (JUNEL FE 24 ORAL) Take by mouth.     • VRAYLAR 3 mg capsule Take 1 capsule by mouth every morning. (Patient not taking: Reported on 12/8/2022) 30 capsule 0     No current facility-administered medications for this visit.         Review of Systems:  Review of Systems   Constitutional: Positive for fatigue and unexpected weight change.   Respiratory: Negative.    Cardiovascular: Positive for palpitations.   Psychiatric/Behavioral: Positive for decreased concentration and dysphoric mood. Negative for confusion, self-injury and suicidal ideas. The patient is nervous/anxious.        Denies pregnancy   Objective     Vital Signs:  Vitals:    12/08/22 1309   BP: 110/70   Pulse: 75   Temp: 36.7 °C (98 °F)   SpO2: 98%       BMI:  Body mass index is 39.71 kg/m².     Physical Exam  Constitutional:       Appearance: She is not ill-appearing or diaphoretic.   Neck:      Comments: Thyroid is not enlarged.  No nodules palpable.  Normal swallow     Cardiovascular:      Rate and Rhythm: Normal rate and regular rhythm.      Pulses: Normal pulses.      Heart sounds: No  murmur heard.  Pulmonary:      Effort: Pulmonary effort is normal.      Breath sounds: Normal breath sounds.   Lymphadenopathy:      Cervical: No cervical adenopathy.   Psychiatric:         Mood and Affect: Mood normal.         Behavior: Behavior normal.         Thought Content: Thought content normal.         Judgment: Judgment normal.      Comments: No barbara good eye contact          Recent labs before today:     Lab Results   Component Value Date    WBC 8.20 06/29/2022    HGB 15.5 06/29/2022    HCT 46.3 (H) 06/29/2022     06/29/2022    ALT 20 06/29/2022    AST 25 06/29/2022     06/29/2022    K 3.8 06/29/2022     06/29/2022    CREATININE 1.0 06/29/2022    BUN 9 06/29/2022    CO2 23 06/29/2022       There are no Patient Instructions on file for this visit.  If you do not hear from me regarding results in 7-10 days either via a call or the portal please call.      Problem List Items Addressed This Visit        Mental Health    Moderate episode of recurrent major depressive disorder (CMS/HCC)     Lamictal, wellbutrin in the past-anger, trileptal for years until stopped working, luvox for some time    seroquel nightmares, strattera pain, risperidol leg spasms   Inderal works well for panic   Genesight testing ordered  No manic episodes since 2020 prior to move to [place]   On wait list for psych in 3/2023- will fill medications until psych appt- fu in one month -buspar and propranolol prn added            Relevant Medications    propranoloL (INDERAL) 10 mg tablet    busPIRone (BUSPAR) 5 mg tablet    fluvoxaMINE (LUVOX) 50 mg tablet   Other Visit Diagnoses     Need for vaccination    -  Primary    Relevant Orders    Influenza vaccine quadrivalent preservative free 6 mon and older IM (FluLaval) (Completed)    Screening for lipid disorders        Relevant Orders    Lipid panel    Screening for diabetes mellitus        Relevant Orders    Basic metabolic panel    Elevated hematocrit        Relevant Orders     CBC and differential             meds   buspar started   Propranolol  To resume junel  Pap next month   Est with psych on wait list appt 3/2023    Consent for [facility]- medicare part B   Assisted with locating medicare psych     I spent 42 minutes on this date of service performing the following activities: obtaining history, performing examination, entering orders, documenting, preparing for visit, obtaining / reviewing records, providing counseling and education and coordinating care.    ADAM Darden  12/8/2022

## 2022-12-08 NOTE — PATIENT INSTRUCTIONS
Routine exercise     Call [facility]/Behavioral [facility] choices    Resume Junel    Add in buspar to your luvox     You can take inderal short acting up to 3 times daily as needed for palpitations - if you feel too tired or your heart rate is low or you feel dizzy you can cut in half     Genesight testing back in two weeks     FU in one month for pap

## 2022-12-08 NOTE — ASSESSMENT & PLAN NOTE
Lamictal, wellbutrin in the past-anger, trileptal for years until stopped working, luvox for some time    seroquel nightmares, strattera pain, risperidol leg spasms   Inderal works well for panic   Genesight testing ordered  No manic episodes since 2020 prior to move to [place]   On wait list for psych in 3/2023- will fill medications until psych appt- fu in one month -buspar and propranolol prn added

## 2022-12-23 ENCOUNTER — TELEPHONE (OUTPATIENT)
Dept: FAMILY MEDICINE | Facility: CLINIC | Age: 27
End: 2022-12-23

## 2022-12-23 NOTE — TELEPHONE ENCOUNTER
Ear Pain for a few weeks would like to schedule a visit for he week of 12/26 for herself and her boyfriend who I having Ear Pain also Jim Fernandez MRN: [id number]. I am not finding any availability.    Callback # [phone]

## 2022-12-27 ENCOUNTER — OFFICE VISIT (OUTPATIENT)
Dept: FAMILY MEDICINE | Facility: CLINIC | Age: 27
End: 2022-12-27
Payer: MEDICARE

## 2022-12-27 VITALS
BODY MASS INDEX: 39.37 KG/M2 | WEIGHT: 245 LBS | SYSTOLIC BLOOD PRESSURE: 114 MMHG | DIASTOLIC BLOOD PRESSURE: 72 MMHG | HEART RATE: 82 BPM | TEMPERATURE: 97.9 F | HEIGHT: 66 IN | OXYGEN SATURATION: 98 %

## 2022-12-27 DIAGNOSIS — H69.93 DYSFUNCTION OF BOTH EUSTACHIAN TUBES: Primary | ICD-10-CM

## 2022-12-27 DIAGNOSIS — E03.9 ACQUIRED HYPOTHYROIDISM: ICD-10-CM

## 2022-12-27 DIAGNOSIS — J01.90 ACUTE SINUSITIS, RECURRENCE NOT SPECIFIED, UNSPECIFIED LOCATION: ICD-10-CM

## 2022-12-27 PROCEDURE — 99214 OFFICE O/P EST MOD 30 MIN: CPT | Performed by: NURSE PRACTITIONER

## 2022-12-27 RX ORDER — LIOTHYRONINE SODIUM 5 UG/1
5 TABLET ORAL DAILY
Qty: 90 TABLET | Refills: 1 | Status: SHIPPED | OUTPATIENT
Start: 2022-12-27 | End: 2023-10-10 | Stop reason: ALTCHOICE

## 2022-12-27 RX ORDER — AZELASTINE 1 MG/ML
1 SPRAY, METERED NASAL 2 TIMES DAILY
Qty: 30 ML | Refills: 1 | Status: SHIPPED | OUTPATIENT
Start: 2022-12-27 | End: 2023-01-17

## 2022-12-27 RX ORDER — LEVOTHYROXINE SODIUM 100 UG/1
100 TABLET ORAL DAILY
Qty: 90 TABLET | Refills: 1 | Status: SHIPPED | OUTPATIENT
Start: 2022-12-27 | End: 2023-10-10 | Stop reason: ALTCHOICE

## 2022-12-27 ASSESSMENT — ENCOUNTER SYMPTOMS
FEVER: 0
WHEEZING: 0
COUGH: 1
HEADACHES: 0
VOMITING: 0
LIGHT-HEADEDNESS: 0
NAUSEA: 0
SHORTNESS OF BREATH: 0
DIARRHEA: 0
CONSTIPATION: 0
CHILLS: 0
ABDOMINAL PAIN: 0
FATIGUE: 0

## 2022-12-27 NOTE — PROGRESS NOTES
[facility]  [address] [place] [address]  [phone]     Reason for visit:   Chief Complaint   Patient presents with   • Earache / Otalgia     Would like to check to have her ears checked to see if there is wax or an infection. Right more than the left side.    Reorder labs in chart for [facility]?      [facility]   Geneva Roman is a 27 y.o. female who presents with eustachian tube dysfunction 2/2 sinusitis        Medical History:  Past Medical History:   Diagnosis Date   • ADHD    • Decreased libido    • Depression    • Endometriosis    • Heartburn    • Hyponatremia    • Plantar fasciitis    • Snoring    • Thyroid disease        Surgical History:  Past Surgical History:   Procedure Laterality Date   • LAPAROSCOPIC ENDOMETRIOSIS FULGURATION      2016 AND 2018       Social History:  Social History     Social History Narrative    Moved from [place] for fiance met on [address]        Enjoys gardening, wood burning, rose and painting         On disability x 3 years but would like to return to work            Family History:  Family History   Problem Relation Age of Onset   • Thyroid disease Biological Mother    • Mental illness Biological Mother    • Thyroid disease Biological Father    • Mental illness Biological Father    • Depression Biological Sister    • Depression Biological Sister    • Depression Biological Sister    • Multiple sclerosis Biological Sister    • Depression Biological Brother    • No Known Problems Maternal Grandmother    • Colon cancer Maternal Grandfather    • No Known Problems Paternal Grandmother    • No Known Problems Paternal Grandfather        Allergies:  Doxycycline    Current Medications:  Current Outpatient Medications   Medication Sig Dispense Refill   • busPIRone (BUSPAR) 5 mg tablet Take 1 tablet (5 mg total) by mouth 2 (two) times a day. 60 tablet 0   • fluvoxaMINE (LUVOX) 50 mg tablet Take 2 tablets (100 mg total) by mouth 2 (two) times a day. 120 tablet 0    • norethindrone-e.estradiol-iron (JUNEL FE 24 ORAL) Take by mouth.     • levothyroxine (SYNTHROID) 100 mcg tablet TAKE 1 TABLET BY MOUTH EVERY DAY (Patient not taking: Reported on 12/8/2022) 30 tablet 0   • liothyronine (CYTOMEL) 5 mcg tablet TAKE 1 TABLET BY MOUTH EVERY DAY (Patient not taking: Reported on 12/8/2022) 30 tablet 0     No current facility-administered medications for this visit.       Review of Systems:  Review of Systems   Constitutional: Negative for chills, fatigue and fever.   Respiratory: Positive for cough (lingering). Negative for shortness of breath and wheezing.    Cardiovascular: Negative for chest pain.   Gastrointestinal: Negative for abdominal pain, constipation, diarrhea, nausea and vomiting.   Neurological: Negative for light-headedness and headaches.       Objective     Vital Signs:  Vitals:    12/27/22 1758   BP: 114/72   Pulse: 82   Temp: 36.6 °C (97.9 °F)   SpO2: 98%       BMI:  Body mass index is 39.54 kg/m².     Physical Exam  Constitutional:       Appearance: Normal appearance.   HENT:      Head: Normocephalic and atraumatic.      Right Ear: Hearing normal. Tympanic membrane is bulging.      Left Ear: Hearing normal. Tympanic membrane is bulging.      Nose:      Right Turbinates: Swollen.      Left Turbinates: Swollen.   Cardiovascular:      Rate and Rhythm: Normal rate and regular rhythm.      Heart sounds: Normal heart sounds.   Pulmonary:      Effort: Pulmonary effort is normal.      Breath sounds: Normal breath sounds.   Neurological:      General: No focal deficit present.      Mental Status: She is alert and oriented to person, place, and time.   Psychiatric:         Attention and Perception: Attention and perception normal.         Mood and Affect: Mood and affect normal.         Speech: Speech normal.         Behavior: Behavior normal. Behavior is cooperative.         Thought Content: Thought content normal.         Cognition and Memory: Cognition normal.          Judgment: Judgment normal.         Recent labs before today:     Lab Results   Component Value Date    WBC 8.20 06/29/2022    HGB 15.5 06/29/2022    HCT 46.3 (H) 06/29/2022     06/29/2022    ALT 20 06/29/2022    AST 25 06/29/2022     06/29/2022    K 3.8 06/29/2022     06/29/2022    CREATININE 1.0 06/29/2022    BUN 9 06/29/2022    CO2 23 06/29/2022        Procedures   Assessment     Patient Instructions   Eustachian tube dysfunction 2/2 sinusitis. Astelin, 1 spray into each nostril twice daily. Follow up if not improving    Refill thyroid meds.    [unfilled]  Problem List Items Addressed This Visit        Endocrine/Metabolic    Acquired hypothyroidism   Other Visit Diagnoses     Dysfunction of both eustachian tubes    -  Primary    Acute sinusitis, recurrence not specified, unspecified location                 The total time spent ON THE DAY OF THE VISIT was 35 minutes, including preparing to see the patient, obtaining and reviewing separately obtained history, performing medically appropriate examination or evaluation, counseling and educating patient/family/caregiver, ordering medications, tests or procedures, referring and communicating with other health care professionals, documenting clinical information in electronic or other record, independently interpreting and communicating results to patient/family/caregiver, and/or care coordination.             Param Avilez DNP, ADAM  12/27/2022      This document was created using Dragon dictation software.  There might be some typographical errors due to this technology.

## 2022-12-27 NOTE — PATIENT INSTRUCTIONS
Eustachian tube dysfunction 2/2 sinusitis. Astelin, 1 spray into each nostril twice daily. Follow up if not improving    Refill thyroid meds.

## 2023-01-04 ENCOUNTER — TELEPHONE (OUTPATIENT)
Dept: FAMILY MEDICINE | Facility: CLINIC | Age: 28
End: 2023-01-04
Payer: MEDICARE

## 2023-01-04 NOTE — TELEPHONE ENCOUNTER
Geneva is having some side effects from her medication Buspar 5mg. She is having trouble sleeping and what feels like restless leg syndrome when trying to sleep. He would like a call back to discuss what she can do to help with the side effects. Please call patient back and advise.

## 2023-01-04 NOTE — TELEPHONE ENCOUNTER
Spoke with pt. approx 1 week after starting buspar she started having RLS symptoms (urge to constantly move legs, leg spasm) at night. Symptoms are effecting her sleep at night. States last night symptoms were especially bad. Feels Buspar is helping with mod and would prefer to stay on it. Asking if there is anything she can do to help with side effect.

## 2023-01-04 NOTE — TELEPHONE ENCOUNTER
My chart message sent   I think hold on the buspar as it is causing more side effects.  I do not want to add a medication to combat those.  Were you able to get into psych?

## 2023-01-17 ENCOUNTER — OFFICE VISIT (OUTPATIENT)
Dept: FAMILY MEDICINE | Facility: CLINIC | Age: 28
End: 2023-01-17
Payer: MEDICARE

## 2023-01-17 VITALS
HEIGHT: 66 IN | DIASTOLIC BLOOD PRESSURE: 70 MMHG | BODY MASS INDEX: 38.09 KG/M2 | OXYGEN SATURATION: 98 % | SYSTOLIC BLOOD PRESSURE: 112 MMHG | HEART RATE: 61 BPM | TEMPERATURE: 99.5 F | RESPIRATION RATE: 18 BRPM | WEIGHT: 237 LBS

## 2023-01-17 DIAGNOSIS — F17.200 SMOKER: ICD-10-CM

## 2023-01-17 DIAGNOSIS — F33.1 MODERATE EPISODE OF RECURRENT MAJOR DEPRESSIVE DISORDER (CMS/HCC): ICD-10-CM

## 2023-01-17 DIAGNOSIS — F90.0 ATTENTION DEFICIT HYPERACTIVITY DISORDER (ADHD), PREDOMINANTLY INATTENTIVE TYPE: ICD-10-CM

## 2023-01-17 DIAGNOSIS — F43.10 PTSD (POST-TRAUMATIC STRESS DISORDER): ICD-10-CM

## 2023-01-17 DIAGNOSIS — F31.9 BIPOLAR 1 DISORDER (CMS/HCC): ICD-10-CM

## 2023-01-17 DIAGNOSIS — E87.1 HYPONATREMIA: ICD-10-CM

## 2023-01-17 DIAGNOSIS — Z12.9 SCREENING FOR CANCER: Primary | ICD-10-CM

## 2023-01-17 DIAGNOSIS — E03.9 ACQUIRED HYPOTHYROIDISM: ICD-10-CM

## 2023-01-17 PROCEDURE — 87624 HPV HI-RISK TYP POOLED RSLT: CPT | Performed by: NURSE PRACTITIONER

## 2023-01-17 PROCEDURE — G0101 CA SCREEN;PELVIC/BREAST EXAM: HCPCS | Performed by: NURSE PRACTITIONER

## 2023-01-17 PROCEDURE — PBSUR OPERATIVE NOTE CHARGE: Performed by: NURSE PRACTITIONER

## 2023-01-17 PROCEDURE — G0123 SCREEN CERV/VAG THIN LAYER: HCPCS | Performed by: NURSE PRACTITIONER

## 2023-01-17 PROCEDURE — 87591 N.GONORRHOEAE DNA AMP PROB: CPT | Performed by: NURSE PRACTITIONER

## 2023-01-17 ASSESSMENT — ENCOUNTER SYMPTOMS
DIFFICULTY URINATING: 0
DYSPHORIC MOOD: 1
FREQUENCY: 0
HEMATURIA: 0
FLANK PAIN: 0

## 2023-01-17 NOTE — ASSESSMENT & PLAN NOTE
Lamictal, wellbutrin in the past-anger, trileptal for years until stopped working, luvox for some time    seroquel nightmares, strattera pain, risperidol leg spasms   Inderal works well for panic   Genesight testing ordered  No manic episodes since 2020 prior to move to [place]   On wait list for psych in 3/2023- will fill medications until psych appt- fu in one month -buspar and propranolol prn added   She did not tolerate buspar.  Based on her genesight study and benefit with luvox we increased her dose to 150 mg twice daily from 100 mg twice daily

## 2023-01-17 NOTE — PATIENT INSTRUCTIONS
Establish with psych     Bring Genesight     Bring last two notes       Vitamin D level aim for 40, if you are lower then we will start a supplement and recheck a blood test in 8 weeks - no need to fast.      I recommend obtaining calcium in your diet which is absorbed better.  Aim for at least 50% of  your intake from your foods.      Postmenopausal Calcium intake 1200 mg daily     Premenopausal 1000 mg daily     Estimated each serving of dairy, soy, almond or oat milk is about 300 mg   Dark green leafy veggies, breads, cereal   Tofu with calcium 435  Almonds 70      Supplements to achieve total intake     calcium carbonate absorbed better with meals, do not choose this if your are taking at PPI    Calcium citrate better on empty stomach     Self breast exams are your best method of early detection.  #Feel it on the first or if you have pets do it when you are giving them their flea and tick.        If you have a family history of cancer, you may wish to consider genetic counseling.  This  can help determine your risk and whether we should do additional screening or earlier testing.      STDS are on the rise, so if you have any new sexual partners please let us know and we can offer testing.      30 minutes of exercise 5 days per week is beneficial to reduce your risk of many types of cancer.  Breast cancer risk is also correlated directly with weight.      Eating a diet that focuses on veggies can also keep your risk of cancer lower as well as diabetes and heart disease.  Half your plate should be veggies.    Stress plays a huge role in our health.  Meditation is proven to reduce stress.  5 minutes per day.  Many apps to choose from my favorites are Simply Being and Headspace.  We all have 5 minutes.      Increase your success with forming a habit.    Associate it with something you are already doing.    Assign personal meaning.    Schedule it- may it a routine.

## 2023-01-17 NOTE — ASSESSMENT & PLAN NOTE
Took adderall in the past and this did really well  She is would like to resume. Seeing psych 1/20/2023- will defer

## 2023-01-17 NOTE — PROGRESS NOTES
[facility]  [address] [place] [address]  [phone]     Reason for visit:   Chief Complaint   Patient presents with   • Annual GYN Exam      HPI   Geneva Roman is a 27 y.o. female who presents for a routine well visit.          HCM:    BSE:yes     Gyn/pap-normal no prior history of abnormal     Menses- normal about 3 weeks ago       Social History:  Social History     Social History Narrative    Moved from [place] for [facility] met on insta        Enjoys gardening, wood burning, rose and painting         On disability x 3 years but would like to return to work            Medical History:  Past Medical History:   Diagnosis Date   • ADHD    • Decreased libido    • Depression    • Endometriosis    • Heartburn    • Hyponatremia    • Plantar fasciitis    • Snoring    • Thyroid disease        Problem list:  @problist@    Surgical History:  Past Surgical History:   Procedure Laterality Date   • LAPAROSCOPIC ENDOMETRIOSIS FULGURATION      2016 AND 2018         Family History:  Family History   Problem Relation Age of Onset   • Thyroid disease Biological Mother    • Mental illness Biological Mother    • Thyroid disease Biological Father    • Mental illness Biological Father    • Depression Biological Sister    • Depression Biological Sister    • Depression Biological Sister    • Multiple sclerosis Biological Sister    • Depression Biological Brother    • No Known Problems Maternal Grandmother    • Colon cancer Maternal Grandfather    • No Known Problems Paternal Grandmother    • No Known Problems Paternal Grandfather        Allergies:  Doxycycline    Current Medications:  Current Outpatient Medications   Medication Sig Dispense Refill   • fluvoxaMINE (LUVOX) 100 mg tablet Take 1.5 tablets (150 mg total) by mouth 2 (two) times a day. 180 tablet 0   • levothyroxine (SYNTHROID) 100 mcg tablet Take 1 tablet (100 mcg total) by mouth daily. 90 tablet 1   • liothyronine (CYTOMEL) 5 mcg  tablet Take 1 tablet (5 mcg total) by mouth daily. 90 tablet 1   • norethindrone-e.estradiol-iron (JUNEL FE 24 ORAL) Take by mouth.     • azelastine (ASTELIN) 137 mcg (0.1 %) nasal spray Administer 1 spray into each nostril 2 (two) times a day. Use in each nostril as directed. (Patient not taking: Reported on 1/17/2023) 30 mL 1     No current facility-administered medications for this visit.       Review of Systems:  Review of Systems   Genitourinary: Negative for decreased urine volume, difficulty urinating, flank pain, frequency, genital sores, hematuria, menstrual problem, pelvic pain, urgency, vaginal bleeding, vaginal discharge and vaginal pain.   Psychiatric/Behavioral: Positive for dysphoric mood. Negative for self-injury and suicidal ideas.       Objective     Vital Signs:  Vitals:    01/17/23 1003   BP: 112/70   Pulse: 61   Resp: 18   Temp: 37.5 °C (99.5 °F)   SpO2: 98%       BMI:  Body mass index is 38.25 kg/m².     Physical Exam  Exam conducted with a chaperone present.   Cardiovascular:      Rate and Rhythm: Normal rate and regular rhythm.   Genitourinary:     Comments: No CMT or tenderness with exam  No masses   Cervix diffucult to visualize even with longer speculum -very deep   No polyps   No discharge   Rectum without lesions or masses     Thin prep with HPV testing sent     Skin:     Capillary Refill: Capillary refill takes less than 2 seconds.   Neurological:      General: No focal deficit present.      Mental Status: She is alert.   Psychiatric:      Comments: Anxious but calms with interaction          Recent labs before today:     Lab Results   Component Value Date    WBC 8.20 06/29/2022    HGB 15.5 06/29/2022    HCT 46.3 (H) 06/29/2022     06/29/2022    ALT 20 06/29/2022    AST 25 06/29/2022     06/29/2022    K 3.8 06/29/2022     06/29/2022    CREATININE 1.0 06/29/2022    BUN 9 06/29/2022    CO2 23 06/29/2022        Procedures   Assessment     Patient Instructions   Establish  with psych     Bring Genesight     Bring last two notes       Vitamin D level aim for 40, if you are lower then we will start a supplement and recheck a blood test in 8 weeks - no need to fast.      I recommend obtaining calcium in your diet which is absorbed better.  Aim for at least 50% of  your intake from your foods.      Postmenopausal Calcium intake 1200 mg daily     Premenopausal 1000 mg daily     Estimated each serving of dairy, soy, almond or oat milk is about 300 mg   Dark green leafy veggies, breads, cereal   Tofu with calcium 435  Almonds 70      Supplements to achieve total intake     calcium carbonate absorbed better with meals, do not choose this if your are taking at PPI    Calcium citrate better on empty stomach     Self breast exams are your best method of early detection.  #Feel it on the first or if you have pets do it when you are giving them their flea and tick.        If you have a family history of cancer, you may wish to consider genetic counseling.  This  can help determine your risk and whether we should do additional screening or earlier testing.      STDS are on the rise, so if you have any new sexual partners please let us know and we can offer testing.      30 minutes of exercise 5 days per week is beneficial to reduce your risk of many types of cancer.  Breast cancer risk is also correlated directly with weight.      Eating a diet that focuses on veggies can also keep your risk of cancer lower as well as diabetes and heart disease.  Half your plate should be veggies.    Stress plays a huge role in our health.  Meditation is proven to reduce stress.  5 minutes per day.  Many apps to choose from my favorites are Simply Being and Headspace.  We all have 5 minutes.      Increase your success with forming a habit.    Associate it with something you are already doing.    Assign personal meaning.    Schedule it- may it a routine.              Path results will take about one week to return.   If you do not hear from me regarding results either via a phone call or the portal within 7-10 days please call our office.      Problem List Items Addressed This Visit        Endocrine/Metabolic    Acquired hypothyroidism     Labs ordered in November but still pending -stressed monitoring especially with recent mood changes                   Mental Health    PTSD (post-traumatic stress disorder)    Bipolar 1 disorder (CMS/HCC)    Smoker    Moderate episode of recurrent major depressive disorder (CMS/HCC)     Lamictal, wellbutrin in the past-anger, trileptal for years until stopped working, luvox for some time    seroquel nightmares, strattera pain, risperidol leg spasms   Inderal works well for panic   Genesight testing ordered  No manic episodes since 2020 prior to move to [place]   On wait list for psych in 3/2023- will fill medications until psych appt- fu in one month -buspar and propranolol prn added   She did not tolerate buspar.  Based on her genesight study and benefit with luvox we increased her dose to 150 mg twice daily from 100 mg twice daily          Attention deficit hyperactivity disorder (ADHD), predominantly inattentive type     Took adderall in the past and this did really well  She is would like to resume. Seeing psych 1/20/2023- will defer             Other    Hyponatremia   Other Visit Diagnoses     Screening for cancer    -  Primary    Relevant Orders    Cytology, Thinprep Pap                  ADAM Darden

## 2023-01-17 NOTE — ASSESSMENT & PLAN NOTE
Labs ordered in November but still pending -stressed monitoring especially with recent mood changes

## 2023-01-19 LAB
C TRACH RRNA SPEC QL NAA+PROBE: NEGATIVE
N GONORRHOEA RRNA SPEC QL NAA+PROBE: NEGATIVE

## 2023-01-27 LAB
CASE RPRT: NORMAL
CLINICAL INFO: NORMAL
CLINICAL INFO: NORMAL
HPV E6+E7 MRNA CVX QL NAA+PROBE: NEGATIVE
LMP START DATE: NORMAL
SPECIMEN PROCESSING COMMENT: NORMAL
THIN PREP CVX: NORMAL

## 2023-03-07 ENCOUNTER — OFFICE VISIT (OUTPATIENT)
Dept: FAMILY MEDICINE | Facility: CLINIC | Age: 28
End: 2023-03-07
Payer: MEDICARE

## 2023-03-07 VITALS
SYSTOLIC BLOOD PRESSURE: 124 MMHG | BODY MASS INDEX: 37.48 KG/M2 | OXYGEN SATURATION: 97 % | DIASTOLIC BLOOD PRESSURE: 72 MMHG | HEART RATE: 70 BPM | TEMPERATURE: 97.7 F | RESPIRATION RATE: 18 BRPM | WEIGHT: 232.2 LBS

## 2023-03-07 DIAGNOSIS — F32.A MODERATELY SEVERE DEPRESSION: Primary | ICD-10-CM

## 2023-03-07 DIAGNOSIS — Z3A.09 9 WEEKS GESTATION OF PREGNANCY: ICD-10-CM

## 2023-03-07 PROCEDURE — 99214 OFFICE O/P EST MOD 30 MIN: CPT | Performed by: FAMILY MEDICINE

## 2023-03-07 RX ORDER — CITALOPRAM 10 MG/1
10 TABLET ORAL DAILY
Qty: 90 TABLET | Refills: 0 | Status: SHIPPED | OUTPATIENT
Start: 2023-03-07 | End: 2023-03-31

## 2023-03-07 RX ORDER — PROPRANOLOL HYDROCHLORIDE 10 MG/1
10 TABLET ORAL 3 TIMES DAILY
COMMUNITY
End: 2023-03-07

## 2023-03-07 ASSESSMENT — ENCOUNTER SYMPTOMS
LIGHT-HEADEDNESS: 0
HEADACHES: 0
NERVOUS/ANXIOUS: 1
DIZZINESS: 0
DECREASED CONCENTRATION: 1
FATIGUE: 0
SLEEP DISTURBANCE: 1

## 2023-03-07 ASSESSMENT — PATIENT HEALTH QUESTIONNAIRE - PHQ9
SUM OF ALL RESPONSES TO PHQ9 QUESTIONS 1 & 2: 3
SUM OF ALL RESPONSES TO PHQ QUESTIONS 1-9: 16

## 2023-03-07 NOTE — PROGRESS NOTES
Subjective      Patient ID: Geneva Roman is a 27 y.o. female.  1995      HPI    Patient who is 9 weeks pregnant presents to the office for depression evaluation. Sx include depressed, being bored, sad and disinterested that got worse a couple months ago. No external factors that caused the symptoms. She is currently on Luvox and propranolol prn prescribed by her previous pcp. She failed multiple therapy, including prozac, viibryd, zoloft and buspar, in the past. Still looking for a therapist and psychiatrist.     The following have been reviewed and updated as appropriate in this visit:   Allergies  Meds  Problems       Review of Systems   Constitutional: Negative for fatigue.   Neurological: Negative for dizziness, light-headedness and headaches.   Psychiatric/Behavioral: Positive for behavioral problems, decreased concentration and sleep disturbance. Negative for self-injury and suicidal ideas. The patient is nervous/anxious.        Objective     Vitals:    03/07/23 1356   BP: 124/72   BP Location: Left upper arm   Patient Position: Sitting   Pulse: 70   Resp: 18   Temp: 36.5 °C (97.7 °F)   SpO2: 97%   Weight: 105 kg (232 lb 3.2 oz)     Body mass index is 37.48 kg/m².    Physical Exam  Vitals and nursing note reviewed.   Constitutional:       Appearance: She is well-developed. She is obese.   HENT:      Head: Normocephalic and atraumatic.   Cardiovascular:      Rate and Rhythm: Normal rate and regular rhythm.      Heart sounds: Normal heart sounds. No murmur heard.     No friction rub.   Pulmonary:      Effort: Pulmonary effort is normal. No respiratory distress.      Breath sounds: Normal breath sounds. No wheezing or rales.   Musculoskeletal:         General: Normal range of motion.      Cervical back: Normal range of motion and neck supple.   Lymphadenopathy:      Cervical: No cervical adenopathy.   Skin:     General: Skin is warm and dry.   Neurological:      Mental Status: She is alert  and oriented to person, place, and time.   Psychiatric:         Behavior: Behavior normal.         Assessment/Plan   Diagnoses and all orders for this visit:    Moderately severe depression (Primary)  Comments:  chronic. worsening. discussed treatment plan including medications and therapy. start cymbalta 10mg daily. referred to [facility] psych. fup in 6 weeks. stop luvox  Orders:  -     Ambulatory Referal to [facility]  -     citalopram (celeXA) 10 mg tablet; Take 1 tablet (10 mg total) by mouth daily.    9 weeks gestation of pregnancy  Comments:  advised to stop taking propranolol      I spent 30 minutes on this date of service performing the following activities: obtaining history, performing examination, entering orders, documenting, preparing for visit, obtaining / reviewing records and providing counseling and education.

## 2023-03-09 ENCOUNTER — TELEPHONE (OUTPATIENT)
Dept: ADMISSIONS | Facility: HOSPITAL | Age: 28
End: 2023-03-09
Payer: MEDICARE

## 2023-03-09 NOTE — TELEPHONE ENCOUNTER
Spoke with pt briefly. She is interested on psych and therapy. Pt is 9 weeks pregnant and may be beneficial to schedule at [facility].

## 2023-03-13 ENCOUNTER — TELEPHONE (OUTPATIENT)
Dept: ADMISSIONS | Facility: HOSPITAL | Age: 28
End: 2023-03-13
Payer: MEDICARE

## 2023-03-13 NOTE — TELEPHONE ENCOUNTER
Initial Intake    Geneva Lashell Roman, a 27 y.o. female     Yellow Zone   Yellow Zone discussion and plan: Pt is also interested in individual therapy. [facility] is not accepting new individual therapy pts at this time. Pt did not want to schedule with [facility] because they will not have availability until mid/late May.  Advised Luz Marina RAZA and Liz CRUZ if something opens up, pt would be interested in IT at [facility].    General  Reason for seeking services (in client's own words)?: Pt called back to schedule OP psychiatry. She is also interested in individual therapy.  She is currently 9 weeks pregnant.    Current Mental Health Symptoms  Current Symptoms: Depression  Depression: Dysphoria; Anhedonia (Loss of interest in things, Anxious which is worse at night)    Mental Health Treatment History  Prior Treatment Reported?: Yes  Type of Treatment: Outpatient    Outpatient  Details: Has had psychiatry for med management before  Was the treatment voluntary?: Yes  Was treatment completed?: Yes    Medical  Extensive History: None  Are you currently experiencing any medical problems that you feel may require emergency care?: No  Are you able to complete ADLs?: I can do that. But the question is do I want to because I'm sad.  Do you require the use of any assistive devices?: No  Medications: Celexa 10mg, Levothyroxine 100mcg, Liothyronine 5mcg, Prenatal vitamin    Suicide Thoughts/Plans  Have you had suicidal thoughts in the past 72 hours?: No  Have you ever had suicidal thoughts?: No  Have you ever harmed yourself?: Yes  When was the last time?: Last time about 2 years ago  Details: Self-harming cutting  Do you have easy access to firearms?: No    Violence/Trauma  Do you currently have, or have you ever had, thoughts of harming someone else?: No    Employment and Legal  Are you actively employed?: No  If not, how do you get your insurance?: Other  Are you presently or have you ever been a ? (Career or  Volunteer): No  Do you now or have you in the past had any legal involvement?: No     Period  Are you seeking treatment at the [facility] related to the  period (before, during, after pregnancy/adoption)? : Yes  Are you seeking treatment at the [facility] related to a  loss?: No    Pregnancy/Breastfeeding  Are you pregnant?: Yes  If yes, how many weeks gestation?: 8 weeks, 6 days  Was the pregnancy planned?: No  Are you receiving prenatal care?: Yes  Indicate location: Other - see comments (Looking for a new OBGYN)  Are you currently breastfeeding or bottle feeding?: No    Substance Use Details  Substance Use Includes: None      Eating Disorders  Do you have any problematic food related behaviors?: No  Have you ever been preoccupied with your looks or body image?: No    Additional Information  Determination: [facility] for Outpatient  Psychiatry Evaluation

## 2023-03-22 ENCOUNTER — OFFICE VISIT (OUTPATIENT)
Dept: FAMILY MEDICINE | Facility: CLINIC | Age: 28
End: 2023-03-22
Payer: MEDICARE

## 2023-03-22 VITALS
HEART RATE: 88 BPM | WEIGHT: 231 LBS | OXYGEN SATURATION: 98 % | BODY MASS INDEX: 37.12 KG/M2 | TEMPERATURE: 98 F | RESPIRATION RATE: 16 BRPM | DIASTOLIC BLOOD PRESSURE: 78 MMHG | SYSTOLIC BLOOD PRESSURE: 120 MMHG | HEIGHT: 66 IN

## 2023-03-22 DIAGNOSIS — J01.90 ACUTE NON-RECURRENT SINUSITIS, UNSPECIFIED LOCATION: ICD-10-CM

## 2023-03-22 DIAGNOSIS — E03.9 HYPOTHYROIDISM, UNSPECIFIED TYPE: Primary | ICD-10-CM

## 2023-03-22 PROCEDURE — 99214 OFFICE O/P EST MOD 30 MIN: CPT | Performed by: STUDENT IN AN ORGANIZED HEALTH CARE EDUCATION/TRAINING PROGRAM

## 2023-03-22 RX ORDER — B-COMPLEX WITH VITAMIN C
TABLET ORAL
COMMUNITY
End: 2023-10-10 | Stop reason: ALTCHOICE

## 2023-03-22 RX ORDER — AMOXICILLIN AND CLAVULANATE POTASSIUM 875; 125 MG/1; MG/1
1 TABLET, FILM COATED ORAL 2 TIMES DAILY
Qty: 14 TABLET | Refills: 0 | Status: SHIPPED | OUTPATIENT
Start: 2023-03-22 | End: 2023-03-29

## 2023-03-22 NOTE — ASSESSMENT & PLAN NOTE
TSH 0.12 on labs done by GYN. Currently taking Synthroid 100 mcg and liothyronine 10 mcg. I recommended she decrease synthroid by 100 mcg weekly. Advised her to take 100 mcg on 6 days of the week and hold the dose on one day of the week. Will repeat TSH in 4 weeks.

## 2023-03-22 NOTE — PROGRESS NOTES
"Henrietta Siegel D.O.  [facility]  [address] [place] [address]  [address] [place] [address]  [phone]      HISTORY OF PRESENT ILLNESS        Chief Complaint   Patient presents with   • URI     Patient states 11 weeks pregnant, URI symptoms ongoing for a month and a half        HPI:  Geneva Roman is a 27 y.o. female who presents to discuss the following.    # Hypothyroidism  Taking Synthroid 100 mcg and Liothyronine 5 mcg daily  TSH 0.12 on labs done by gyn    # URI sx  A lot of drainage and sinus pressure  Endorses headaches and burning pain  No fevers, sore throat, or cough      Current Outpatient Medications on File Prior to Visit   Medication Sig   • citalopram (celeXA) 10 mg tablet Take 1 tablet (10 mg total) by mouth daily.   • levothyroxine (SYNTHROID) 100 mcg tablet Take 1 tablet (100 mcg total) by mouth daily.   • liothyronine (CYTOMEL) 5 mcg tablet Take 1 tablet (5 mcg total) by mouth daily.   • prenatal vit no.130-iron-folic (PRENATAL VITAMIN) 27 mg iron- 800 mcg tablet tablet      No current facility-administered medications on file prior to visit.        ROS        All systems reviewed and negative except as otherwise stated in HPI   PHYSICAL EXAMINATION      Visit Vitals  /78 (BP Location: Right upper arm, Patient Position: Sitting)   Pulse 88   Temp 36.7 °C (98 °F) (Temporal)   Resp 16   Ht 1.676 m (5' 6\")   Wt 105 kg (231 lb)   LMP 12/28/2022   SpO2 98%   BMI 37.28 kg/m²        Body mass index is 37.28 kg/m².     Wt Readings from Last 3 Encounters:   03/22/23 105 kg (231 lb)   03/07/23 105 kg (232 lb 3.2 oz)   01/17/23 108 kg (237 lb)      BMI Readings from Last 3 Encounters:   03/22/23 37.28 kg/m²   03/07/23 37.48 kg/m²   01/17/23 38.25 kg/m²        Physical Exam  Constitutional:       General: She is not in acute distress.     Appearance: Normal appearance. She is not ill-appearing or toxic-appearing.   HENT:      Right Ear: Tympanic membrane and ear canal " normal.      Left Ear: Tympanic membrane and ear canal normal.      Mouth/Throat:      Pharynx: Posterior oropharyngeal erythema present. No oropharyngeal exudate.   Cardiovascular:      Rate and Rhythm: Normal rate and regular rhythm.      Heart sounds: No murmur heard.  Pulmonary:      Effort: Pulmonary effort is normal. No respiratory distress.      Breath sounds: No stridor. No wheezing, rhonchi or rales.   Neurological:      Mental Status: She is alert.          ASSESSMENT AND PLAN   Diagnoses and all orders for this visit:    Hypothyroidism, unspecified type (Primary)  Assessment & Plan:  TSH 0.12 on labs done by GYN. Currently taking Synthroid 100 mcg and liothyronine 10 mcg. I recommended she decrease synthroid by 100 mcg weekly. Advised her to take 100 mcg on 6 days of the week and hold the dose on one day of the week. Will repeat TSH in 4 weeks.    Orders:  -     TSH; Future  -     Thyroid peroxidase antibody; Future  -     amoxicillin-pot clavulanate (AUGMENTIN) 875-125 mg per tablet; Take 1 tablet by mouth 2 (two) times a day for 7 days.    Acute non-recurrent sinusitis, unspecified location  -     amoxicillin-pot clavulanate (AUGMENTIN) 875-125 mg per tablet; Take 1 tablet by mouth 2 (two) times a day for 7 days.         Current Outpatient Medications:   •  amoxicillin-pot clavulanate (AUGMENTIN) 875-125 mg per tablet, Take 1 tablet by mouth 2 (two) times a day for 7 days., Disp: 14 tablet, Rfl: 0  •  citalopram (celeXA) 10 mg tablet, Take 1 tablet (10 mg total) by mouth daily., Disp: 90 tablet, Rfl: 0  •  levothyroxine (SYNTHROID) 100 mcg tablet, Take 1 tablet (100 mcg total) by mouth daily., Disp: 90 tablet, Rfl: 1  •  liothyronine (CYTOMEL) 5 mcg tablet, Take 1 tablet (5 mcg total) by mouth daily., Disp: 90 tablet, Rfl: 1  •  prenatal vit no.130-iron-folic (PRENATAL VITAMIN) 27 mg iron- 800 mcg tablet tablet, , Disp: , Rfl:      Return in about 11 weeks (around 6/7/2023) for Next scheduled follow-up,  establish care.     I spent 30 minutes on this date of service performing the following activities: obtaining history, performing examination, entering orders, documenting, preparing for visit, obtaining / reviewing records, providing counseling and education and independently reviewing study/studies.      Henrietta Siegel,   3/22/2023

## 2023-03-31 ENCOUNTER — TELEPHONE (OUTPATIENT)
Dept: FAMILY MEDICINE | Facility: CLINIC | Age: 28
End: 2023-03-31
Payer: MEDICARE

## 2023-03-31 RX ORDER — SERTRALINE HYDROCHLORIDE 25 MG/1
25 TABLET, FILM COATED ORAL DAILY
Qty: 90 TABLET | Refills: 0 | Status: SHIPPED | OUTPATIENT
Start: 2023-03-31 | End: 2023-10-10 | Stop reason: ALTCHOICE

## 2023-03-31 NOTE — TELEPHONE ENCOUNTER
Does not like the way the she feels on citalopram. Feels it is making her depression worse. She would like to go back to Zoloft. States Zoloft worked in the past but is caused sweating which is why she stopped it. She prefers to return to this drug. Pt is 11 weeks pregnant. Please advise.

## 2023-03-31 NOTE — TELEPHONE ENCOUNTER
Patient called stating that she is having adverse reactions to citalopram and is asking if she can take Zoloft instead. Patient can be reached at [phone].    Thank you!  Kristin

## 2023-04-11 ENCOUNTER — OFFICE VISIT (OUTPATIENT)
Dept: FAMILY MEDICINE | Facility: CLINIC | Age: 28
End: 2023-04-11
Payer: MEDICARE

## 2023-04-11 VITALS
SYSTOLIC BLOOD PRESSURE: 115 MMHG | BODY MASS INDEX: 36.48 KG/M2 | HEART RATE: 88 BPM | TEMPERATURE: 98 F | RESPIRATION RATE: 16 BRPM | WEIGHT: 227 LBS | OXYGEN SATURATION: 100 % | HEIGHT: 66 IN | DIASTOLIC BLOOD PRESSURE: 70 MMHG

## 2023-04-11 DIAGNOSIS — E06.3 HYPOTHYROIDISM DUE TO HASHIMOTO'S THYROIDITIS: ICD-10-CM

## 2023-04-11 DIAGNOSIS — M62.81 PROXIMAL MUSCLE WEAKNESS: ICD-10-CM

## 2023-04-11 DIAGNOSIS — H60.533 ACUTE CONTACT OTITIS EXTERNA OF BOTH EARS: Primary | ICD-10-CM

## 2023-04-11 DIAGNOSIS — O26.811 PREGNANCY RELATED FATIGUE IN FIRST TRIMESTER: ICD-10-CM

## 2023-04-11 PROBLEM — J01.90 ACUTE NON-RECURRENT SINUSITIS: Status: RESOLVED | Noted: 2022-10-11 | Resolved: 2023-04-11

## 2023-04-11 PROBLEM — F41.9 ANXIETY: Status: RESOLVED | Noted: 2021-09-27 | Resolved: 2023-04-11

## 2023-04-11 PROCEDURE — 99214 OFFICE O/P EST MOD 30 MIN: CPT | Performed by: STUDENT IN AN ORGANIZED HEALTH CARE EDUCATION/TRAINING PROGRAM

## 2023-04-11 RX ORDER — ACETIC ACID 20.65 MG/ML
5 SOLUTION AURICULAR (OTIC) 3 TIMES DAILY
Qty: 5.25 ML | Refills: 0 | Status: SHIPPED | OUTPATIENT
Start: 2023-04-11 | End: 2023-04-18

## 2023-04-11 NOTE — PROGRESS NOTES
"    Henrietta Siegel D.O.  [facility]  [address]  [place] [address]  [phone]      HISTORY OF PRESENT ILLNESS        Chief Complaint   Patient presents with   • Sinus Problem   • Earache / Otalgia        HPI:  Geneva Roman is a 27 y.o. female who presents to discuss the following.    Weakness for a couple weeks  Limbs feel really heavy  No pain  Sleep has been pretty good  Switched to zoloft a few days before onset of sx  Recently decreased dose of synthroid due to elevated TSH  Endorses more mood swings    Also complaining of ear itching, no drainage  Went swimming  Ear is non-tender, non-painful  No fevers    Current Outpatient Medications on File Prior to Visit   Medication Sig   • levothyroxine (SYNTHROID) 100 mcg tablet Take 1 tablet (100 mcg total) by mouth daily.   • liothyronine (CYTOMEL) 5 mcg tablet Take 1 tablet (5 mcg total) by mouth daily.   • prenatal vit no.130-iron-folic (PRENATAL VITAMIN) 27 mg iron- 800 mcg tablet tablet    • sertraline (ZOLOFT) 25 mg tablet Take 1 tablet (25 mg total) by mouth daily.     No current facility-administered medications on file prior to visit.        ROS        All systems reviewed and negative except as otherwise stated in HPI   PHYSICAL EXAMINATION      Visit Vitals  /70 (BP Location: Right upper arm, Patient Position: Sitting)   Pulse 88   Temp 36.7 °C (98 °F) (Temporal)   Resp 16   Ht 1.676 m (5' 6\")   Wt 103 kg (227 lb)   LMP 12/28/2022   SpO2 100%   BMI 36.64 kg/m²        Body mass index is 36.64 kg/m².     Wt Readings from Last 3 Encounters:   04/11/23 103 kg (227 lb)   03/22/23 105 kg (231 lb)   03/07/23 105 kg (232 lb 3.2 oz)      BMI Readings from Last 3 Encounters:   04/11/23 36.64 kg/m²   03/22/23 37.28 kg/m²   03/07/23 37.48 kg/m²        Physical Exam  Vitals reviewed.   Constitutional:       General: She is not in acute distress.     Appearance: Normal appearance. She is not ill-appearing " or toxic-appearing.   HENT:      Right Ear: Tympanic membrane and ear canal normal. There is no impacted cerumen.      Left Ear: Tympanic membrane and ear canal normal. There is no impacted cerumen.   Cardiovascular:      Rate and Rhythm: Normal rate and regular rhythm.      Heart sounds: No murmur heard.  Pulmonary:      Effort: Pulmonary effort is normal. No respiratory distress.      Breath sounds: No stridor. No wheezing, rhonchi or rales.   Neurological:      Mental Status: She is alert.          ASSESSMENT AND PLAN   Diagnoses and all orders for this visit:    Acute contact otitis externa of both ears (Primary)  Assessment & Plan:  No erythema or flaking of external canal. Main symptom is itching, no pain. Recommend trigger avoidance and trial of vosol.    Orders:  -     acetic acid (VOSOL) 2 % otic solution; Administer 5 drops into each ear 3 (three) times a day for 7 days.    Proximal muscle weakness  Assessment & Plan:  Will evaluate with CMP, iron studies, TSH    Orders:  -     Comprehensive metabolic panel; Future  -     CBC and Differential; Future  -     Iron and TIBC; Future  -     Ferritin; Future    Hypothyroidism due to Hashimoto's thyroiditis  Assessment & Plan:  Will recheck TSH sooner in the setting of pregnancy and proximal muscle weakness after recent dose adjustment for low TSH. Currently taking 100 mcg synthroid 6 days of the week and skips one day (Thursday.) Also takes cytomel 5 mcg.    Orders:  -     TSH; Future    Pregnancy related fatigue in first trimester  -     Comprehensive metabolic panel; Future  -     CBC and Differential; Future  -     Iron and TIBC; Future  -     Ferritin; Future         Current Outpatient Medications:   •  acetic acid (VOSOL) 2 % otic solution, Administer 5 drops into each ear 3 (three) times a day for 7 days., Disp: 5.25 mL, Rfl: 0  •  levothyroxine (SYNTHROID) 100 mcg tablet, Take 1 tablet (100 mcg total) by mouth daily., Disp: 90 tablet, Rfl: 1  •   liothyronine (CYTOMEL) 5 mcg tablet, Take 1 tablet (5 mcg total) by mouth daily., Disp: 90 tablet, Rfl: 1  •  prenatal vit no.130-iron-folic (PRENATAL VITAMIN) 27 mg iron- 800 mcg tablet tablet, , Disp: , Rfl:   •  sertraline (ZOLOFT) 25 mg tablet, Take 1 tablet (25 mg total) by mouth daily., Disp: 90 tablet, Rfl: 0     Return for Next scheduled follow-up.     I spent 30 minutes on this date of service performing the following activities: obtaining history, performing examination, entering orders, documenting, preparing for visit, obtaining / reviewing records, providing counseling and education and independently reviewing study/studies.      Henrietta Siegel,   4/11/2023

## 2023-04-11 NOTE — ASSESSMENT & PLAN NOTE
Will recheck TSH sooner in the setting of pregnancy and proximal muscle weakness after recent dose adjustment for low TSH. Currently taking 100 mcg synthroid 6 days of the week and skips one day (Thursday.) Also takes cytomel 5 mcg.

## 2023-04-11 NOTE — ASSESSMENT & PLAN NOTE
No erythema or flaking of external canal. Main symptom is itching, no pain. Recommend trigger avoidance and trial of vosol.

## 2023-04-18 ENCOUNTER — TELEPHONE (OUTPATIENT)
Dept: FAMILY MEDICINE | Facility: CLINIC | Age: 28
End: 2023-04-18
Payer: MEDICARE

## 2023-04-18 LAB
ALBUMIN SERPL-MCNC: 3.6 G/DL (ref 3.6–5.1)
ALBUMIN/GLOB SERPL: 1.2 (CALC) (ref 1–2.5)
ALP SERPL-CCNC: 77 U/L (ref 31–125)
ALT SERPL-CCNC: 13 U/L (ref 6–29)
AST SERPL-CCNC: 15 U/L (ref 10–30)
BASOPHILS # BLD AUTO: 41 CELLS/UL (ref 0–200)
BASOPHILS NFR BLD AUTO: 0.4 %
BILIRUB SERPL-MCNC: 0.3 MG/DL (ref 0.2–1.2)
BUN SERPL-MCNC: 8 MG/DL (ref 7–25)
BUN/CREAT SERPL: ABNORMAL (CALC) (ref 6–22)
CALCIUM SERPL-MCNC: 9.2 MG/DL (ref 8.6–10.2)
CHLORIDE SERPL-SCNC: 107 MMOL/L (ref 98–110)
CO2 SERPL-SCNC: 19 MMOL/L (ref 20–32)
CREAT SERPL-MCNC: 0.64 MG/DL (ref 0.5–0.96)
EGFRCR SERPLBLD CKD-EPI 2021: 125 ML/MIN/1.73M2
EOSINOPHIL # BLD AUTO: 196 CELLS/UL (ref 15–500)
EOSINOPHIL NFR BLD AUTO: 1.9 %
ERYTHROCYTE [DISTWIDTH] IN BLOOD BY AUTOMATED COUNT: 12.4 % (ref 11–15)
FERRITIN SERPL-MCNC: 67 NG/ML (ref 16–154)
GLOBULIN SER CALC-MCNC: 3 G/DL (CALC) (ref 1.9–3.7)
GLUCOSE SERPL-MCNC: 80 MG/DL (ref 65–99)
HCT VFR BLD AUTO: 39.5 % (ref 35–45)
HGB BLD-MCNC: 13.7 G/DL (ref 11.7–15.5)
IRON SATN MFR SERPL: 46 % (CALC) (ref 16–45)
IRON SERPL-MCNC: 162 MCG/DL (ref 40–190)
LYMPHOCYTES # BLD AUTO: 1947 CELLS/UL (ref 850–3900)
LYMPHOCYTES NFR BLD AUTO: 18.9 %
MCH RBC QN AUTO: 32.3 PG (ref 27–33)
MCHC RBC AUTO-ENTMCNC: 34.7 G/DL (ref 32–36)
MCV RBC AUTO: 93.2 FL (ref 80–100)
MONOCYTES # BLD AUTO: 587 CELLS/UL (ref 200–950)
MONOCYTES NFR BLD AUTO: 5.7 %
NEUTROPHILS # BLD AUTO: 7529 CELLS/UL (ref 1500–7800)
NEUTROPHILS NFR BLD AUTO: 73.1 %
PLATELET # BLD AUTO: 242 THOUSAND/UL (ref 140–400)
PMV BLD REES-ECKER: 9.7 FL (ref 7.5–12.5)
POTASSIUM SERPL-SCNC: 4 MMOL/L (ref 3.5–5.3)
PROT SERPL-MCNC: 6.6 G/DL (ref 6.1–8.1)
RBC # BLD AUTO: 4.24 MILLION/UL (ref 3.8–5.1)
SODIUM SERPL-SCNC: 136 MMOL/L (ref 135–146)
TIBC SERPL-MCNC: 356 MCG/DL (CALC) (ref 250–450)
TSH SERPL-ACNC: 0.81 MIU/L
WBC # BLD AUTO: 10.3 THOUSAND/UL (ref 3.8–10.8)

## 2023-04-18 NOTE — TELEPHONE ENCOUNTER
Labs reviewed.  Normal blood sugar, kidney function, liver function, thyroid function, blood counts, and iron studies.  Because of weakness is unclear, although I suspect could be related to change in antidepressant.  Recommend she schedules a close follow-up to evaluate response to Zoloft and reevaluate symptoms.  Please call and review with patient

## 2023-04-19 ENCOUNTER — TELEPHONE (OUTPATIENT)
Dept: FAMILY MEDICINE | Facility: CLINIC | Age: 28
End: 2023-04-19
Payer: MEDICARE

## 2023-04-19 NOTE — TELEPHONE ENCOUNTER
Spoke with pt. C/o RUQ abd pain for the past few week but pain has increased since this morning and constant. Worse after eating any high fat foods. Pt is 15 weeks pregnant. Due to increase in severity of pain today pt will proceed to ED for eval/imaging

## 2023-05-04 ENCOUNTER — TELEPHONE (OUTPATIENT)
Dept: OBGYN CLINIC | Facility: CLINIC | Age: 28
End: 2023-05-04

## 2023-05-04 NOTE — TELEPHONE ENCOUNTER
Patient would like to transfer from [facility], no happy with care  States Lmp 1/7 Questionable, says she is 17 weeks 1 day as of today  Last visit was 4/25  Patient will try to upload all records to her [address]

## 2023-05-08 ENCOUNTER — NURSE TRIAGE (OUTPATIENT)
Dept: OTHER | Facility: OTHER | Age: 28
End: 2023-05-08

## 2023-05-08 NOTE — TELEPHONE ENCOUNTER
Reason for Disposition  • Unable to complete triage due to phone connection issues    Protocols used: NO CONTACT OR DUPLICATE CONTACT CALL-ADULT-AH

## 2023-05-11 NOTE — TELEPHONE ENCOUNTER
3 attempts made, voicemail message left need MR Release signed to obtain [facility] Outside prenatal reports or for herself to obtain and forwarded to our office for review and appointment will be scheduled

## 2023-10-10 ENCOUNTER — OFFICE VISIT (OUTPATIENT)
Dept: FAMILY MEDICINE | Facility: CLINIC | Age: 28
End: 2023-10-10
Payer: MEDICARE

## 2023-10-10 VITALS
HEART RATE: 78 BPM | RESPIRATION RATE: 18 BRPM | DIASTOLIC BLOOD PRESSURE: 70 MMHG | TEMPERATURE: 98.6 F | SYSTOLIC BLOOD PRESSURE: 110 MMHG | WEIGHT: 245 LBS | OXYGEN SATURATION: 98 % | BODY MASS INDEX: 39.37 KG/M2 | HEIGHT: 66 IN

## 2023-10-10 DIAGNOSIS — F43.10 PTSD (POST-TRAUMATIC STRESS DISORDER): ICD-10-CM

## 2023-10-10 DIAGNOSIS — E03.9 HYPOTHYROIDISM, UNSPECIFIED TYPE: ICD-10-CM

## 2023-10-10 DIAGNOSIS — F33.1 MODERATE EPISODE OF RECURRENT MAJOR DEPRESSIVE DISORDER (CMS/HCC): Primary | ICD-10-CM

## 2023-10-10 PROCEDURE — 99214 OFFICE O/P EST MOD 30 MIN: CPT | Performed by: FAMILY MEDICINE

## 2023-10-10 RX ORDER — FLUVOXAMINE MALEATE 150 MG/1
150 CAPSULE, EXTENDED RELEASE ORAL DAILY
COMMUNITY
End: 2023-10-10 | Stop reason: SDUPTHER

## 2023-10-10 RX ORDER — FLUVOXAMINE MALEATE 150 MG/1
150 CAPSULE, EXTENDED RELEASE ORAL DAILY
Qty: 90 CAPSULE | Refills: 0 | Status: SHIPPED | OUTPATIENT
Start: 2023-10-10 | End: 2024-01-08 | Stop reason: HOSPADM

## 2023-10-10 ASSESSMENT — PATIENT HEALTH QUESTIONNAIRE - PHQ9: SUM OF ALL RESPONSES TO PHQ9 QUESTIONS 1 & 2: 1

## 2023-10-10 NOTE — PROGRESS NOTES
"  Subjective      Patient ID: Geneva Roman is a 28 y.o. female.  1995      HPI     New patient to me presents for medication management. Recently delivered baby boy 9/13/2023. She is not breast feeding. She is doing well and her baby boy is healthy.     Depression/PTSD  Currently on Fluvoxamine 150mg QD and Vraylar 3mg QD. Has been on these medication for about 1 year. Has been out of these medication for past week because her psychiatrist ([facility]arias at [place]) left the practice. She is in need of a refill. Has previously been on prozac, viibryd, zoloft, lamictal and buspar. Was also on seroquel and risperidol in the past. She states that the current combination works the best.     PHQ 2 to 9:  Will the patient answer the depression questions?: Y    Little interest or pleasure in doing things: Not at all    Feeling down, depressed, or hopeless: Several days    Depression Risk: 1      Hypothyroidism    Was on synthroid 100mcg and Cytomel 5mcg previously. Stopped medication 6 months ago because her TSH was normal. TSH on 7/22/2023 was normal.       The following have been reviewed and updated as appropriate in this visit:   Tobacco  Allergies  Meds  Problems  Med Hx  Surg Hx  Fam Hx  Soc   Hx      Review of Systems   As noted in HPI.     Objective     Vitals:    10/10/23 1125   BP: 110/70   BP Location: Left upper arm   Patient Position: Sitting   Pulse: 78   Resp: 18   Temp: 37 °C (98.6 °F)   TempSrc: Oral   SpO2: 98%   Weight: 111 kg (245 lb)   Height: 1.676 m (5' 6\")     Body mass index is 39.54 kg/m².    Physical Exam  Constitutional:       General: She is not in acute distress.     Appearance: She is not ill-appearing or toxic-appearing.   HENT:      Head: Normocephalic and atraumatic.   Eyes:      Extraocular Movements: Extraocular movements intact.   Cardiovascular:      Rate and Rhythm: Normal rate and regular rhythm.   Pulmonary:      Effort: Pulmonary effort is normal. No respiratory " distress.      Breath sounds: Normal breath sounds. No wheezing.   Musculoskeletal:         General: Normal range of motion.      Cervical back: Normal range of motion.   Skin:     General: Skin is warm and dry.   Neurological:      General: No focal deficit present.      Mental Status: She is alert.   Psychiatric:         Mood and Affect: Mood normal.         Assessment/Plan   Diagnoses and all orders for this visit:    Moderate episode of recurrent major depressive disorder (CMS/HCC) (Primary)  Assessment & Plan:  - Currently stable on fluvoxamine 150mg QD and Vraylar 3mg QD   - Referred to psychiatry given history of trying multiple medications     Orders:  -     Ambulatory referral to Psychiatry; Future    PTSD (post-traumatic stress disorder)  Assessment & Plan:  - Stable on fluvoxamine 150mg QD and Vraylar 3mg QD   - Referred to psychiatry     Orders:  -     Ambulatory referral to Psychiatry; Future    Hypothyroidism, unspecified type  Assessment & Plan:  - Stopped Synthroid 100mcg and Cytomel 5mcg 6 months ago  - Check TSH    Orders:  -     TSH w reflex FT4; Future    Other orders  -     fluvoxaMINE (LUVOX CR) 150 mg capsule,extended release 24hr; Take 1 capsule (150 mg total) by mouth daily.  -     cariprazine (VRAYLAR) 3 mg capsule capsule; Take 1 capsule (3 mg total) by mouth daily.      Ester Gomez DO   10/10/2023     I spent 36 minutes on this date of service performing the following activities: obtaining history, performing examination, entering orders, documenting, preparing for visit, obtaining / reviewing records, providing counseling and education, independently reviewing study/studies and coordinating care.

## 2023-10-10 NOTE — ASSESSMENT & PLAN NOTE
- Currently stable on fluvoxamine 150mg QD and Vraylar 3mg QD   - Referred to psychiatry given history of trying multiple medications

## 2023-10-12 ENCOUNTER — TELEPHONE (OUTPATIENT)
Dept: FAMILY MEDICINE | Facility: CLINIC | Age: 28
End: 2023-10-12
Payer: MEDICARE

## 2023-10-12 NOTE — TELEPHONE ENCOUNTER
Waiting for Dr. Gomez to arrive tomorrow to submit due to needing his signature in order to submit the PA.  Will call pt now to let her know.

## 2023-10-12 NOTE — TELEPHONE ENCOUNTER
Patient called for update on coverage regarding Fluvoxamine 150mg   Unsure if can submit PA, appeal, or if med should be changed?

## 2023-10-19 NOTE — TELEPHONE ENCOUNTER
Informed pt that we still haven't heard anything and additional info w/PA was resubmitted.  Advised we will let her know ASAP once hearing something, but in the meantime she can reach out to the insurance company herself to see if they will provide her w/additional info regarding this

## 2023-10-24 DIAGNOSIS — E03.9 ACQUIRED HYPOTHYROIDISM: ICD-10-CM

## 2023-10-24 RX ORDER — LIOTHYRONINE SODIUM 5 UG/1
TABLET ORAL
Qty: 30 TABLET | Refills: 0 | OUTPATIENT
Start: 2023-10-24

## 2023-10-24 RX ORDER — LEVOTHYROXINE SODIUM 100 UG/1
TABLET ORAL
Qty: 30 TABLET | Refills: 0 | OUTPATIENT
Start: 2023-10-24

## 2023-11-13 NOTE — ADDENDUM NOTE
Addended by: SHAWNA HOFFMAN on: 11/22/2022 04:57 PM     Modules accepted: Orders     Quinolones Counseling:  I discussed with the patient the risks of fluoroquinolones including but not limited to GI upset, allergic reaction, drug rash, diarrhea, dizziness, photosensitivity, yeast infections, liver function test abnormalities, tendonitis/tendon rupture.

## 2024-01-17 ENCOUNTER — OFFICE VISIT (OUTPATIENT)
Dept: FAMILY MEDICINE | Facility: CLINIC | Age: 29
End: 2024-01-17
Payer: MEDICARE

## 2024-01-17 VITALS
HEIGHT: 66 IN | RESPIRATION RATE: 16 BRPM | DIASTOLIC BLOOD PRESSURE: 60 MMHG | SYSTOLIC BLOOD PRESSURE: 104 MMHG | OXYGEN SATURATION: 98 % | BODY MASS INDEX: 43.07 KG/M2 | WEIGHT: 268 LBS | TEMPERATURE: 98.1 F | HEART RATE: 78 BPM

## 2024-01-17 DIAGNOSIS — M25.50 ARTHRALGIA, UNSPECIFIED JOINT: Primary | ICD-10-CM

## 2024-01-17 DIAGNOSIS — F31.9 BIPOLAR 1 DISORDER (CMS/HCC): ICD-10-CM

## 2024-01-17 DIAGNOSIS — R35.0 URINARY FREQUENCY: ICD-10-CM

## 2024-01-17 DIAGNOSIS — E03.9 HYPOTHYROIDISM, UNSPECIFIED TYPE: ICD-10-CM

## 2024-01-17 DIAGNOSIS — F33.1 MODERATE EPISODE OF RECURRENT MAJOR DEPRESSIVE DISORDER (CMS/HCC): ICD-10-CM

## 2024-01-17 DIAGNOSIS — F90.0 ATTENTION DEFICIT HYPERACTIVITY DISORDER (ADHD), PREDOMINANTLY INATTENTIVE TYPE: ICD-10-CM

## 2024-01-17 LAB
BILIRUBIN, POC: NEGATIVE
BLOOD URINE, POC: POSITIVE
CLARITY, POC: ABNORMAL
COLOR, POC: YELLOW
EXPIRATION DATE: ABNORMAL
GLUCOSE URINE, POC: NEGATIVE
KETONES, POC: NEGATIVE
LEUKOCYTE EST, POC: NEGATIVE
Lab: ABNORMAL
NITRITE, POC: NEGATIVE
PH, POC: 6.5
POCT MANUFACTURER: ABNORMAL
PROTEIN, POC: NEGATIVE
SPECIFIC GRAVITY, POC: 1
UROBILINOGEN, POC: 0.2

## 2024-01-17 PROCEDURE — 99214 OFFICE O/P EST MOD 30 MIN: CPT | Performed by: FAMILY MEDICINE

## 2024-01-17 PROCEDURE — 81002 URINALYSIS NONAUTO W/O SCOPE: CPT | Performed by: FAMILY MEDICINE

## 2024-01-17 RX ORDER — FLUVOXAMINE MALEATE 100 MG/1
100 TABLET, COATED ORAL 2 TIMES DAILY
Qty: 180 TABLET | Refills: 0 | Status: SHIPPED | OUTPATIENT
Start: 2024-01-17 | End: 2024-02-26 | Stop reason: ALTCHOICE

## 2024-01-17 RX ORDER — FLUVOXAMINE MALEATE 50 MG/1
TABLET, FILM COATED ORAL
COMMUNITY
Start: 2023-11-01 | End: 2024-01-17 | Stop reason: DRUGHIGH

## 2024-01-17 RX ORDER — DEXTROAMPHETAMINE SACCHARATE, AMPHETAMINE ASPARTATE, DEXTROAMPHETAMINE SULFATE AND AMPHETAMINE SULFATE 2.5; 2.5; 2.5; 2.5 MG/1; MG/1; MG/1; MG/1
TABLET ORAL
COMMUNITY
Start: 2023-12-21 | End: 2024-01-17 | Stop reason: SDUPTHER

## 2024-01-17 RX ORDER — SERTRALINE HYDROCHLORIDE 50 MG/1
100 TABLET, FILM COATED ORAL DAILY
COMMUNITY
Start: 2023-10-24 | End: 2024-01-17 | Stop reason: DRUGHIGH

## 2024-01-17 RX ORDER — SERTRALINE HYDROCHLORIDE 100 MG/1
100 TABLET, FILM COATED ORAL 2 TIMES DAILY
Qty: 180 TABLET | Refills: 0 | Status: SHIPPED | OUTPATIENT
Start: 2024-01-17 | End: 2024-03-20 | Stop reason: ALTCHOICE

## 2024-01-17 RX ORDER — NITROFURANTOIN 25; 75 MG/1; MG/1
100 CAPSULE ORAL 2 TIMES DAILY
Qty: 10 CAPSULE | Refills: 0 | Status: SHIPPED | OUTPATIENT
Start: 2024-01-17 | End: 2024-01-22

## 2024-01-17 RX ORDER — DEXTROAMPHETAMINE SACCHARATE, AMPHETAMINE ASPARTATE, DEXTROAMPHETAMINE SULFATE AND AMPHETAMINE SULFATE 2.5; 2.5; 2.5; 2.5 MG/1; MG/1; MG/1; MG/1
10 TABLET ORAL 2 TIMES DAILY
Qty: 60 TABLET | Refills: 0 | Status: SHIPPED | OUTPATIENT
Start: 2024-01-17 | End: 2024-02-26 | Stop reason: SDUPTHER

## 2024-01-17 NOTE — ASSESSMENT & PLAN NOTE
- Patient reports 5-6 week hx of b/l knee, ankle and right hip pain   - Unclear etiology   - Ddx includes post-viral arthralgia vs fibromyalgia vs tendonitis     Plan:  - Will check basic labs  - Continue with Advil prn   - If labs normal, can consider Physical Therapy

## 2024-01-17 NOTE — ASSESSMENT & PLAN NOTE
- Follows with Psychiatry at [place], Carla Farias   - Provided refills of medications until she sees psych  - Reviewed PDMP

## 2024-01-17 NOTE — ASSESSMENT & PLAN NOTE
- Follows with Psychiatry at [place], Carla Farias   - Reports being on sertraline 100mg BID and fluvoxamine 100mg BID   - Provided refills of medications until she sees psych

## 2024-01-17 NOTE — PROGRESS NOTES
"  Subjective      Patient ID: Geneva Roman is a 28 y.o. female.  1995      HPI     Patient presents for joint pains.    Joint Pains   Started 5-6 weeks ago. Has pain in bilateral knees, ankles and right hip. All these joints pop which gives her relief. Has cold, sharp, gnawing pain in these joints. Never had joint pain like this before. Pain is constant. Has been taking Advil which has been helping. Denies fevers, chills, nausea, vomiting, abdominal pain and night sweats. No recent URI symptoms. No recent injury or fall.     Urinary Frequency   Having urinary frequency. No dysuria. Symptoms started 1 week ago. Has had UTI in the past and usually just has urinary frequency and not dysuria. No back pain. No abdominal pain.     Depression/Anxiety/ADHD  Follows with Psychiatrist at [facility], Carla Farias. Needs refills for Fluvoxamine 100mg BID, Zoloft 100mg BID and adderall 10mg BID. She can't get into see her psychiatrist until March 2024.     The following have been reviewed and updated as appropriate in this visit:   Tobacco  Allergies  Meds  Problems  Med Hx  Surg Hx  Fam Hx  Soc   Hx      Review of Systems   As noted in HPI.     Objective     Vitals:    01/17/24 1618   BP: 104/60   BP Location: Left upper arm   Patient Position: Sitting   Pulse: 78   Resp: 16   Temp: 36.7 °C (98.1 °F)   TempSrc: Oral   SpO2: 98%   Weight: 122 kg (268 lb)   Height: 1.676 m (5' 6\")     Body mass index is 43.26 kg/m².    Physical Exam  Constitutional:       General: She is not in acute distress.     Appearance: She is not ill-appearing or toxic-appearing.   HENT:      Head: Normocephalic and atraumatic.   Eyes:      Extraocular Movements: Extraocular movements intact.   Cardiovascular:      Rate and Rhythm: Normal rate and regular rhythm.   Pulmonary:      Effort: Pulmonary effort is normal. No respiratory distress.      Breath sounds: Normal breath sounds. No wheezing.   Musculoskeletal:         General: " Normal range of motion.      Cervical back: Normal range of motion.      Comments: No tenderness of the knees bilaterally. Negative DASHAWN/FADDIR of right hip.    Neurological:      General: No focal deficit present.      Mental Status: She is alert.   Psychiatric:         Mood and Affect: Mood normal.         Assessment/Plan   Diagnoses and all orders for this visit:    Arthralgia, unspecified joint (Primary)  Assessment & Plan:  - Patient reports 5-6 week hx of b/l knee, ankle and right hip pain   - Unclear etiology   - Ddx includes post-viral arthralgia vs fibromyalgia vs tendonitis     Plan:  - Will check basic labs  - Continue with Advil prn   - If labs normal, can consider Physical Therapy     Orders:  -     CBC and differential; Future  -     Comprehensive metabolic panel; Future  -     Sedimentation rate, automated; Future    Urinary frequency  Assessment & Plan:  - Will check UA w/ culture   - Will treat empirically for UTI     Orders:  -     POCT urinalysis dipstick  -     Urinalysis with Reflex Culture (ED and Outpatient only)    Moderate episode of recurrent major depressive disorder (CMS/[facility])  Assessment & Plan:  - Follows with [facility]iatry at [facility]   - Reports being on sertraline 100mg BID and fluvoxamine 100mg BID   - Provided refills of medications until she sees psych      Bipolar 1 disorder (CMS/[facility])  Assessment & Plan:  - Follows with Psychiatry at [facility]   - Provided refills of medications until she sees psych      Attention deficit hyperactivity disorder (ADHD), predominantly inattentive type  Assessment & Plan:  - Follows with Psychiatry at [facility]   - Provided refills of medications until she sees psych  - Reviewed PDMP       Hypothyroidism, unspecified type  -     TSH w reflex FT4; Future    Other orders  -     fluvoxaMINE (LUVOX) 100 mg tablet; Take 1 tablet (100 mg total) by mouth 2 (two) times a day.  -     sertraline (ZOLOFT) 100 mg tablet; Take 1 tablet (100 mg  total) by mouth 2 (two) times a day.  -     dextroamphetamine-amphetamine (ADDERALL) 10 mg tablet; Take 1 tablet (10 mg total) by mouth 2 (two) times a day.  -     nitrofurantoin, macrocrystal-monohydrate, (MACROBID) 100 mg capsule; Take 1 capsule (100 mg total) by mouth 2 (two) times a day for 5 days.      Ester Gomez DO   1/17/2024

## 2024-01-17 NOTE — ASSESSMENT & PLAN NOTE
- Follows with Psychiatry at [place], [facility] Farias   - Provided refills of medications until she sees psych

## 2024-01-18 LAB
APPEARANCE UR: CLEAR
BACTERIA #/AREA URNS HPF: ABNORMAL /HPF
BACTERIA UR CULT: NORMAL
BILIRUB UR QL STRIP: NEGATIVE
COLOR UR: YELLOW
GLUCOSE UR QL STRIP: NEGATIVE
HGB UR QL STRIP: ABNORMAL
HYALINE CASTS #/AREA URNS LPF: ABNORMAL /LPF
KETONES UR QL STRIP: NEGATIVE
LEUKOCYTE ESTERASE UR QL STRIP: NEGATIVE
NITRITE UR QL STRIP: NEGATIVE
PH UR STRIP: 6 [PH] (ref 5–8)
PROT UR QL STRIP: NEGATIVE
RBC #/AREA URNS HPF: ABNORMAL /HPF
SERVICE CMNT-IMP: ABNORMAL
SP GR UR STRIP: 1.02 (ref 1–1.03)
SQUAMOUS #/AREA URNS HPF: ABNORMAL /HPF
WBC #/AREA URNS HPF: ABNORMAL /HPF

## 2024-02-26 ENCOUNTER — OFFICE VISIT (OUTPATIENT)
Dept: FAMILY MEDICINE | Facility: CLINIC | Age: 29
End: 2024-02-26
Payer: MEDICARE

## 2024-02-26 VITALS
HEART RATE: 68 BPM | DIASTOLIC BLOOD PRESSURE: 72 MMHG | TEMPERATURE: 98.6 F | RESPIRATION RATE: 16 BRPM | BODY MASS INDEX: 41.95 KG/M2 | OXYGEN SATURATION: 98 % | SYSTOLIC BLOOD PRESSURE: 102 MMHG | WEIGHT: 261 LBS | HEIGHT: 66 IN

## 2024-02-26 DIAGNOSIS — F41.1 GAD (GENERALIZED ANXIETY DISORDER): Primary | ICD-10-CM

## 2024-02-26 DIAGNOSIS — F90.0 ATTENTION DEFICIT HYPERACTIVITY DISORDER (ADHD), PREDOMINANTLY INATTENTIVE TYPE: ICD-10-CM

## 2024-02-26 PROCEDURE — 99214 OFFICE O/P EST MOD 30 MIN: CPT | Performed by: FAMILY MEDICINE

## 2024-02-26 RX ORDER — DEXTROAMPHETAMINE SACCHARATE, AMPHETAMINE ASPARTATE, DEXTROAMPHETAMINE SULFATE AND AMPHETAMINE SULFATE 2.5; 2.5; 2.5; 2.5 MG/1; MG/1; MG/1; MG/1
10 TABLET ORAL 2 TIMES DAILY
Qty: 60 TABLET | Refills: 0 | Status: SHIPPED | OUTPATIENT
Start: 2024-02-26 | End: 2024-03-20 | Stop reason: SDUPTHER

## 2024-02-26 NOTE — ASSESSMENT & PLAN NOTE
- Patient presents for worsening anxiety  - Will wean off of fluvoxamine as she is also on zoloft and this is too high of a dose of SSRI  - Advise patient to establish care with psychiatry given her history of trailing multple medications   - Will follow-up in 1 month via telemedicine to see how patient is doing

## 2024-02-26 NOTE — PATIENT INSTRUCTIONS
- Fluvoxamine 100mg in morning and 50mg in afternoon for 2 weeks  - Fluvoxamine 100mg in morning for another 2 weeks   - Fluvoxamine 50mg in morning for another 2 weeks   - Fluvoxamine 25mg in morning for another 2 weeks   - Fluvoxamine 25mg every other day for another 2 weeks   - Then Fluvoxamine 3 times a week for 1 week and then stop

## 2024-02-26 NOTE — PROGRESS NOTES
"  Subjective      Patient ID: Geneva Roman is a 28 y.o. female.  1995      HPI     Patient presents for medication management.     Depression/Anxiety/ADHD  Was following with Psychiatrist at [facility], [facility], but states that she will not be going back because the practice may have closed. She is currently on Fluvoxamine and Zoloft. She states that she recently had a traumatic experience and her anxiety has increased. She would like to make medication changes. She was on Effexor in the past and it had helped.       The following have been reviewed and updated as appropriate in this visit:   Tobacco  Allergies  Meds  Problems  Med Hx  Surg Hx  Fam Hx  Soc   Hx      Review of Systems   As noted in HPI.     Objective     Vitals:    02/26/24 1635   BP: 102/72   BP Location: Left upper arm   Patient Position: Sitting   Pulse: 68   Resp: 16   Temp: 37 °C (98.6 °F)   TempSrc: Oral   SpO2: 98%   Weight: 118 kg (261 lb)   Height: 1.676 m (5' 6\")     Body mass index is 42.13 kg/m².    Physical Exam  Constitutional:       General: She is not in acute distress.     Appearance: She is not ill-appearing or toxic-appearing.   HENT:      Head: Normocephalic and atraumatic.   Eyes:      Extraocular Movements: Extraocular movements intact.   Cardiovascular:      Rate and Rhythm: Normal rate and regular rhythm.   Pulmonary:      Effort: Pulmonary effort is normal. No respiratory distress.      Breath sounds: Normal breath sounds. No wheezing.   Musculoskeletal:         General: Normal range of motion.      Cervical back: Normal range of motion.   Skin:     General: Skin is warm and dry.   Neurological:      General: No focal deficit present.      Mental Status: She is alert.   Psychiatric:         Mood and Affect: Mood normal.         Assessment/Plan   Diagnoses and all orders for this visit:    ALIREZA (generalized anxiety disorder) (Primary)  Assessment & Plan:  - Patient presents for worsening " anxiety  - Will wean off of fluvoxamine as she is also on zoloft and this is too high of a dose of SSRI  - Advise patient to establish care with psychiatry given her history of trailing multple medications   - Will follow-up in 1 month via telemedicine to see how patient is doing       Attention deficit hyperactivity disorder (ADHD), predominantly inattentive type  Assessment & Plan:  - Stable on Adderall 10mg BID   - Reviewed PDMP       Other orders  -     dextroamphetamine-amphetamine (ADDERALL) 10 mg tablet; Take 1 tablet (10 mg total) by mouth 2 (two) times a day.      Ester Gomez DO   2/26/2024

## 2024-03-14 RX ORDER — DEXTROAMPHETAMINE SACCHARATE, AMPHETAMINE ASPARTATE, DEXTROAMPHETAMINE SULFATE AND AMPHETAMINE SULFATE 2.5; 2.5; 2.5; 2.5 MG/1; MG/1; MG/1; MG/1
10 TABLET ORAL 2 TIMES DAILY
Qty: 60 TABLET | Refills: 0 | Status: CANCELLED | OUTPATIENT
Start: 2024-03-14 | End: 2024-04-13

## 2024-03-14 NOTE — TELEPHONE ENCOUNTER
"Last filled 2/26/24    Medicine Refill Request    Last Office Visit: 2/26/2024   Last Consult Visit: Visit date not found  Last Telemedicine Visit: Visit date not found    Next Appointment: Visit date not found      Current Outpatient Medications:   •  dextroamphetamine-amphetamine (ADDERALL) 10 mg tablet, Take 1 tablet (10 mg total) by mouth 2 (two) times a day., Disp: 60 tablet, Rfl: 0  •  sertraline (ZOLOFT) 100 mg tablet, Take 1 tablet (100 mg total) by mouth 2 (two) times a day., Disp: 180 tablet, Rfl: 0      BP Readings from Last 3 Encounters:   02/26/24 102/72   01/17/24 104/60   10/10/23 110/70       Recent Lab results:  No results found for: \"CHOL\", No results found for: \"HDL\", No results found for: \"LDLCALC\", No results found for: \"TRIG\"     Lab Results   Component Value Date    GLUCOSE NEGATIVE 01/17/2024   , No results found for: \"HGBA1C\"      Lab Results   Component Value Date    CREATININE 0.64 04/17/2023       Lab Results   Component Value Date    TSH 0.81 04/17/2023         No results found for: \"HGBA1C\"  "

## 2024-03-20 ENCOUNTER — OFFICE VISIT (OUTPATIENT)
Dept: FAMILY MEDICINE | Facility: CLINIC | Age: 29
End: 2024-03-20
Payer: MEDICARE

## 2024-03-20 VITALS
HEART RATE: 78 BPM | TEMPERATURE: 98.5 F | WEIGHT: 255 LBS | DIASTOLIC BLOOD PRESSURE: 64 MMHG | OXYGEN SATURATION: 98 % | HEIGHT: 66 IN | BODY MASS INDEX: 40.98 KG/M2 | SYSTOLIC BLOOD PRESSURE: 110 MMHG | RESPIRATION RATE: 16 BRPM

## 2024-03-20 DIAGNOSIS — F90.0 ATTENTION DEFICIT HYPERACTIVITY DISORDER (ADHD), PREDOMINANTLY INATTENTIVE TYPE: ICD-10-CM

## 2024-03-20 DIAGNOSIS — F33.1 MODERATE EPISODE OF RECURRENT MAJOR DEPRESSIVE DISORDER (CMS/HCC): ICD-10-CM

## 2024-03-20 DIAGNOSIS — F41.1 GAD (GENERALIZED ANXIETY DISORDER): Primary | ICD-10-CM

## 2024-03-20 PROCEDURE — 99214 OFFICE O/P EST MOD 30 MIN: CPT | Performed by: FAMILY MEDICINE

## 2024-03-20 RX ORDER — VENLAFAXINE HYDROCHLORIDE 75 MG/1
75 CAPSULE, EXTENDED RELEASE ORAL DAILY
Qty: 90 CAPSULE | Refills: 0 | Status: SHIPPED | OUTPATIENT
Start: 2024-03-20 | End: 2024-06-17

## 2024-03-20 RX ORDER — DEXTROAMPHETAMINE SACCHARATE, AMPHETAMINE ASPARTATE, DEXTROAMPHETAMINE SULFATE AND AMPHETAMINE SULFATE 2.5; 2.5; 2.5; 2.5 MG/1; MG/1; MG/1; MG/1
10 TABLET ORAL 2 TIMES DAILY
Qty: 60 TABLET | Refills: 0 | Status: SHIPPED | OUTPATIENT
Start: 2024-03-26 | End: 2024-05-03

## 2024-03-20 NOTE — PROGRESS NOTES
"  Subjective      Patient ID: Geneva Roman is a 28 y.o. female.  1995      HPI     Patient presents for medication management.    Depression/Anxiety/ADHD  From last office visit: Was following with Psychiatrist at [facility], Carla Farias, but states that she will not be going back because the practice may have closed. She was on Fluvoxamine and Zoloft. She stated that she recently had a traumatic experience and her anxiety had increased. She wanted  to make medication changes. She was on Effexor in the past and it had helped.    Today patient states that she weaned herself off of both fluvoxamine and sertraline. She finished weaning herself off 3 days ago. She did have withdrawal symptoms (flu like symptoms, electrical shocks, headache) but they have resolved now. She states that her anxiety has significantly improved since weaning herself off. She would like to start another antidepressant. She states that Effexor helped her in the past. She states that she has been feeling more sad since stopping the medication. Denies SI. She is looking into seeing psychiatry at [facility].       The following have been reviewed and updated as appropriate in this visit:   Tobacco  Allergies  Meds  Problems  Med Hx  Surg Hx  Fam Hx  Soc   Hx      Review of Systems   As noted in HPI.     Objective     Vitals:    03/20/24 1638   BP: 110/64   BP Location: Left upper arm   Patient Position: Sitting   Pulse: 78   Resp: 16   Temp: 36.9 °C (98.5 °F)   TempSrc: Oral   SpO2: 98%   Weight: 116 kg (255 lb)   Height: 1.676 m (5' 6\")     Body mass index is 41.16 kg/m².    Physical Exam  Constitutional:       General: She is not in acute distress.     Appearance: She is not ill-appearing or toxic-appearing.   HENT:      Head: Normocephalic and atraumatic.   Eyes:      Extraocular Movements: Extraocular movements intact.   Pulmonary:      Effort: No respiratory distress.   Musculoskeletal:         General: Normal " range of motion.   Skin:     General: Skin is warm and dry.   Neurological:      General: No focal deficit present.      Mental Status: She is alert and oriented to person, place, and time.   Psychiatric:         Mood and Affect: Mood normal.         Thought Content: Thought content does not include suicidal ideation.         Assessment/Plan   Diagnoses and all orders for this visit:    ALIREZA (generalized anxiety disorder) (Primary)  Assessment & Plan:  - Patient weaned herself off of both fluvoxamine and sertraline over the past 3 weeks  - She reports improvement in anxiety but feels more sad   - She would like to start Effexor, which worked well for her in the past     Plan:  - Start Effexor 75mg   - Patient will be making an appointment with psychiatry through [facility]   - Follow-up in 2 months via telemedicine for med check       Moderate episode of recurrent major depressive disorder (CMS/HCC)  Assessment & Plan:  - Patient weaned herself off of both fluvoxamine and sertraline over the past 3 weeks  - She reports improvement in anxiety but feels more sad   - She would like to start Effexor, which worked well for her in the past     Plan:  - Start Effexor 75mg   - Patient will be making an appointment with psychiatry through [facility]   - Follow-up in 2 months via telemedicine for med check       Attention deficit hyperactivity disorder (ADHD), predominantly inattentive type  Assessment & Plan:  - Stable on Adderall 10mg BID   - Reviewed PDMP       Other orders  -     venlafaxine XR (EFFEXOR XR) 75 mg 24 hr capsule; Take 1 capsule (75 mg total) by mouth daily.  -     dextroamphetamine-amphetamine (ADDERALL) 10 mg tablet; Take 1 tablet (10 mg total) by mouth 2 (two) times a day.      Ester Gomez DO   3/20/2024

## 2024-03-20 NOTE — ASSESSMENT & PLAN NOTE
- Patient weaned herself off of both fluvoxamine and sertraline over the past 3 weeks  - She reports improvement in anxiety but feels more sad   - She would like to start Effexor, which worked well for her in the past     Plan:  - Start Effexor 75mg   - Patient will be making an appointment with psychiatry through [facility]   - Follow-up in 2 months via telemedicine for med check

## 2024-03-20 NOTE — ASSESSMENT & PLAN NOTE
- Patient weaned herself off of both fluvoxamine and sertraline over the past 3 weeks  - She reports improvement in anxiety but feels more sad   - She would like to start Effexor, which worked well for her in the past     Plan:  - Start Effexor 75mg   - Patient will be making an appointment with psychiatry through [facility]   - Follow-up in 2 months via telemedicine for med check

## 2024-04-26 ENCOUNTER — TELEPHONE (OUTPATIENT)
Dept: FAMILY MEDICINE | Facility: CLINIC | Age: 29
End: 2024-04-26
Payer: MEDICARE

## 2024-05-03 ENCOUNTER — OFFICE VISIT (OUTPATIENT)
Dept: FAMILY MEDICINE | Facility: CLINIC | Age: 29
End: 2024-05-03
Payer: MEDICARE

## 2024-05-03 VITALS
WEIGHT: 252 LBS | OXYGEN SATURATION: 97 % | TEMPERATURE: 96.2 F | HEIGHT: 66 IN | RESPIRATION RATE: 16 BRPM | BODY MASS INDEX: 40.5 KG/M2 | SYSTOLIC BLOOD PRESSURE: 102 MMHG | HEART RATE: 94 BPM | DIASTOLIC BLOOD PRESSURE: 64 MMHG

## 2024-05-03 DIAGNOSIS — F33.1 MODERATE EPISODE OF RECURRENT MAJOR DEPRESSIVE DISORDER (CMS/HCC): ICD-10-CM

## 2024-05-03 DIAGNOSIS — E66.01 OBESITY, MORBID (CMS/HCC): ICD-10-CM

## 2024-05-03 DIAGNOSIS — F41.1 GAD (GENERALIZED ANXIETY DISORDER): ICD-10-CM

## 2024-05-03 DIAGNOSIS — F90.0 ATTENTION DEFICIT HYPERACTIVITY DISORDER (ADHD), PREDOMINANTLY INATTENTIVE TYPE: Primary | ICD-10-CM

## 2024-05-03 PROCEDURE — G2211 COMPLEX E/M VISIT ADD ON: HCPCS | Performed by: FAMILY MEDICINE

## 2024-05-03 PROCEDURE — 99214 OFFICE O/P EST MOD 30 MIN: CPT | Performed by: FAMILY MEDICINE

## 2024-05-03 RX ORDER — LISDEXAMFETAMINE DIMESYLATE 10 MG/1
10 CAPSULE ORAL DAILY
Qty: 30 CAPSULE | Refills: 0 | Status: SHIPPED | OUTPATIENT
Start: 2024-05-03 | End: 2024-08-07

## 2024-05-03 NOTE — ASSESSMENT & PLAN NOTE
- Patient has exhibited symptoms which meet diagnostic criteria for DSM-V diagnosis of Adult ADD- see above  - Assessed possible risks for misuse, abuse, and addiction based on family and social history  - Educated patient on possible side effects and risks while taking this type of medication including abuse, misuse and addiction  - Checked PDMP and no red flags noted.  - Controlled substance contract reviewed and signed, scanned to chart.      Plan:  - Will switch from Adderall to Vyvanse due to side effect with Adderall   - Follow-up in 1 month for med check (can be telemedicine)

## 2024-05-03 NOTE — PROGRESS NOTES
"  Subjective      Patient ID: Geneva Roman is a 28 y.o. female.  1995      HPI    Patient presents for medication check.    Depression/Anxiety/ADHD   Patient was started on Effexor at last office visit on 3/20/2024 after she weaned herself off of sertraline and fluvoxamine. She was previously following with Psychiatry.     She states the Effexor has been helping but could be helping more. She states she happy cries and sad cries a lot. Effexor is working better than when she was on sertraline and fluvoxamine. Denies SI and thoughts about self harm.     Stopped Adderall because she states she got very lethargic when she took it with Effexor. She is interested in trying Vyvanse. She states her insurance said Vyvanse could be covered. She has had issues with getting Adderall filled. Last took Adderall about 1 month ago. Was on Adderall for about 6 years. First started Adderall around age 20. She is not breast feeding.     Looking into establishing with psychiatry at [facility]. [facility] does not take her insurance.     The following have been reviewed and updated as appropriate in this visit:   Tobacco  Allergies  Meds  Problems  Med Hx  Surg Hx  Fam Hx  Soc   Hx      Review of Systems  As noted in HPI.     Objective     Vitals:    05/03/24 1328   BP: 102/64   BP Location: Right upper arm   Patient Position: Sitting   Pulse: 94   Resp: 16   Temp: (!) 35.7 °C (96.2 °F)   TempSrc: Oral   SpO2: 97%   Weight: 114 kg (252 lb)   Height: 1.676 m (5' 6\")     Body mass index is 40.67 kg/m².    Physical Exam  Constitutional:       General: She is not in acute distress.     Appearance: She is not ill-appearing or toxic-appearing.   HENT:      Head: Normocephalic and atraumatic.   Eyes:      Extraocular Movements: Extraocular movements intact.   Pulmonary:      Effort: No respiratory distress.   Musculoskeletal:         General: Normal range of motion.      Cervical back: Normal range of motion. "   Skin:     General: Skin is warm and dry.   Neurological:      General: No focal deficit present.      Mental Status: She is alert.   Psychiatric:         Mood and Affect: Mood normal.         Thought Content: Thought content does not include suicidal ideation. Thought content does not include suicidal plan.         Assessment/Plan   Diagnoses and all orders for this visit:    Attention deficit hyperactivity disorder (ADHD), predominantly inattentive type (Primary)  Assessment & Plan:  - Patient has exhibited symptoms which meet diagnostic criteria for DSM-V diagnosis of Adult ADD- see above  - Assessed possible risks for misuse, abuse, and addiction based on family and social history  - Educated patient on possible side effects and risks while taking this type of medication including abuse, misuse and addiction  - Checked PDMP and no red flags noted.  - Controlled substance contract reviewed and signed, scanned to chart.      Plan:  - Will switch from Adderall to Vyvanse due to side effect with Adderall   - Follow-up in 1 month for med check (can be telemedicine)       ALIREZA (generalized anxiety disorder)  Assessment & Plan:  - Stable  - Continue Effexor 75mg  - Can consider increasing dose in future       Moderate episode of recurrent major depressive disorder (CMS/HCC)  Assessment & Plan:  - Stable  - Continue Effexor 75mg  - Can consider increasing dose in future       Obesity, morbid (CMS/HCC)  Assessment & Plan:  - Will  on healthy diet and exercise       Other orders  -     lisdexamfetamine (VYVANSE) 10 mg capsule; Take 1 capsule (10 mg total) by mouth daily.      Ester Gomez DO   5/3/2024

## 2024-05-06 ENCOUNTER — TELEPHONE (OUTPATIENT)
Dept: FAMILY MEDICINE | Facility: CLINIC | Age: 29
End: 2024-05-06
Payer: MEDICARE

## 2024-05-06 NOTE — TELEPHONE ENCOUNTER
All SSRIs and SNRIs will cause the same side effects/interactions with Vyvanse. She will need to see a psychiatrist for further medication management. I recommend she stay on Effexor and not start Vyvanse if she has side effects/interactions and see psychiatry.

## 2024-05-06 NOTE — TELEPHONE ENCOUNTER
Pt. Cannot take Effexor with Vyvanse due to the side effects/interactions. Pt. Interested in exploring a different SNRI, SSRI, or other antidepressant that perhaps has less risk for dangerous side effects from interactions. Please advise.

## 2024-06-16 DIAGNOSIS — F33.1 MODERATE EPISODE OF RECURRENT MAJOR DEPRESSIVE DISORDER (CMS/HCC): ICD-10-CM

## 2024-06-16 DIAGNOSIS — F31.9 BIPOLAR 1 DISORDER (CMS/HCC): Primary | ICD-10-CM

## 2024-06-16 DIAGNOSIS — F41.1 GAD (GENERALIZED ANXIETY DISORDER): ICD-10-CM

## 2024-06-17 RX ORDER — VENLAFAXINE HYDROCHLORIDE 75 MG/1
CAPSULE, EXTENDED RELEASE ORAL DAILY
Qty: 90 CAPSULE | Refills: 1 | Status: SHIPPED | OUTPATIENT
Start: 2024-06-17 | End: 2024-08-07

## 2024-06-17 NOTE — TELEPHONE ENCOUNTER
"Medicine Refill Request    Last Office Visit: 5/3/2024   Last Consult Visit: Visit date not found  Last Telemedicine Visit: Visit date not found    Next Appointment: Visit date not found      Current Outpatient Medications:     lisdexamfetamine (VYVANSE) 10 mg capsule, Take 1 capsule (10 mg total) by mouth daily., Disp: 30 capsule, Rfl: 0    venlafaxine XR (EFFEXOR XR) 75 mg 24 hr capsule, Take 1 capsule (75 mg total) by mouth daily., Disp: 90 capsule, Rfl: 0      BP Readings from Last 3 Encounters:   05/03/24 102/64   03/20/24 110/64   02/26/24 102/72       Recent Lab results:  No results found for: \"CHOL\", No results found for: \"HDL\", No results found for: \"LDLCALC\", No results found for: \"TRIG\"     Lab Results   Component Value Date    GLUCOSE NEGATIVE 01/17/2024   , No results found for: \"HGBA1C\"      Lab Results   Component Value Date    CREATININE 0.64 04/17/2023       Lab Results   Component Value Date    TSH 0.81 04/17/2023         No results found for: \"HGBA1C\"  "

## 2024-08-07 ENCOUNTER — TELEMEDICINE (OUTPATIENT)
Dept: FAMILY MEDICINE | Facility: CLINIC | Age: 29
End: 2024-08-07
Payer: MEDICARE

## 2024-08-07 DIAGNOSIS — F41.1 GAD (GENERALIZED ANXIETY DISORDER): ICD-10-CM

## 2024-08-07 DIAGNOSIS — F33.1 MODERATE EPISODE OF RECURRENT MAJOR DEPRESSIVE DISORDER (CMS/HCC): Primary | ICD-10-CM

## 2024-08-07 PROCEDURE — 99213 OFFICE O/P EST LOW 20 MIN: CPT | Mod: 95 | Performed by: FAMILY MEDICINE

## 2024-08-07 RX ORDER — SERTRALINE HYDROCHLORIDE 100 MG/1
200 TABLET, FILM COATED ORAL DAILY
Qty: 180 TABLET | Refills: 0 | Status: SHIPPED | OUTPATIENT
Start: 2024-08-07 | End: 2024-11-05

## 2024-08-07 RX ORDER — AZITHROMYCIN 250 MG/1
TABLET, FILM COATED ORAL
Qty: 6 TABLET | Refills: 0 | Status: SHIPPED | OUTPATIENT
Start: 2024-08-07 | End: 2025-03-19 | Stop reason: HOSPADM

## 2024-08-07 RX ORDER — NORETHINDRONE ACETATE AND ETHINYL ESTRADIOL 1MG-20(21)
1 KIT ORAL DAILY
Qty: 84 TABLET | Refills: 3 | Status: SHIPPED | OUTPATIENT
Start: 2024-08-07 | End: 2025-03-19 | Stop reason: HOSPADM

## 2024-08-07 RX ORDER — ARIPIPRAZOLE 5 MG/1
5 TABLET ORAL DAILY
Qty: 90 TABLET | Refills: 0 | Status: SHIPPED | OUTPATIENT
Start: 2024-08-07 | End: 2024-11-15

## 2024-08-07 NOTE — PROGRESS NOTES
Verification of Patient Location:  The patient affirms they are currently located in the following state: [place]    Request for Consent:    Audio and Video Encounter   Hello, my name is Ester DO Jason.  Before we proceed, can you please verify your identification by telling me your full name and date of birth?  Can you tell me who is in the room with you?    You and I are about to have a telemedicine check-in or visit because you have requested it.  This is a live video-conference.  I am a real person, speaking to you in real time.  There is no one else with me on the video-conference. I am not recording this conversation and I am asking you not to record it.  This telemedicine visit will be billed to your health insurance or you, if you are self-insured.  You understand you will be responsible for any copayments or coinsurances that apply to your telemedicine visit.  Communication platform used for this encounter:  [employer] Video Visit (Epic Video Client)       Before starting our telemedicine visit, I am required to get your consent for this virtual check-in or visit by telemedicine. Do you consent?    Patient Response to Request for Consent:  Yes      Visit Documentation:  Subjective     Patient ID: Geneva Roman is a 28 y.o. female.  1995      HPI    Patient presents for medication management.     Depression/Anxiety  Patient was previously on Effexor 75mg but she stopped it because she feels like it was not helping. She restarted her previous medications of sertraline 100mg BID and Abilify 5mg BID two weeks ago. She states that this medication regimen is working for her. She would like refills for sertraline and Abilify. She still has not established with psychiatry. She is also requesting a prescription for ativan.     - She would like to start OCP  - She reports 1 week hx of nasal congestion.     The following have been reviewed and updated as appropriate in this visit:   Tobacco   Allergies  Meds  Problems  Med Hx  Surg Hx  Fam Hx  Soc   Hx      Review of Systems  As noted in HPI.       Assessment/Plan   Diagnoses and all orders for this visit:    Moderate episode of recurrent major depressive disorder (CMS/HCC) (Primary)    ALIREZA (generalized anxiety disorder)    Other orders  -     azithromycin (ZITHROMAX) 250 mg tablet; Take 2 tablets the first day, then 1 tablet daily for 4 days.  -     sertraline (ZOLOFT) 100 mg tablet; Take 2 tablets (200 mg total) by mouth daily.  -     ARIPiprazole (ABILIFY) 5 mg tablet; Take 1 tablet (5 mg total) by mouth daily.  -     norethindrone-e.estradioL-iron (JUNEL FE 1/20, 28,) 1 mg-20 mcg (21)/75 mg (7) per tablet; Take 1 tablet by mouth daily.      Patient presents for medication management. She stopped Effexor and started her old medications of sertraline 100mg BID and Abilify 5mg BID, which she states is working better for her. I again emphasized to patient that she needs to establish with a psychiatrist due to her complex psychiatric history and being on numerous medications in the past. I again emphasized that anti-psychotic medications need to be managed by psychiatry. I agreed to refill sertraline and Abilify this time. I advised patient that I will not be prescribing lorazepam as it is a controlled medication and I do not want to start too many medications at once. Given her recent ER visit for disorganized behavior and possible diagnosis of bipolar, she needs to be following with psychiatry.     Time Spent:  I spent 20 minutes on this date of service performing the following activities: obtaining history, performing examination, entering orders, documenting, preparing for visit, obtaining / reviewing records, providing counseling and education, independently reviewing study/studies, communicating results, and coordinating care.

## 2024-08-20 ENCOUNTER — TELEPHONE (OUTPATIENT)
Dept: FAMILY MEDICINE | Facility: CLINIC | Age: 29
End: 2024-08-20
Payer: MEDICARE

## 2024-08-20 NOTE — TELEPHONE ENCOUNTER
Sinus infection, took z-alfred. Pt. Reports continued sinus concerns. Wants to quit tobacco. Would like to discuss with PCP.

## 2024-08-22 ENCOUNTER — TELEPHONE (OUTPATIENT)
Dept: FAMILY MEDICINE | Facility: CLINIC | Age: 29
End: 2024-08-22

## 2024-08-22 NOTE — TELEPHONE ENCOUNTER
Patient no showed another appointment.  I have left a message for patient to call office to discuss future scheduling.

## 2024-09-05 NOTE — TELEPHONE ENCOUNTER
"Unable to complete triage  Patient states \"im just going to the emergency room  \" and ended call     " Infusion Nursing Note:  Pascual Perea presents today for C2D2 Toposar.    Patient seen by provider today: No   present during visit today: Not Applicable.    Note: Pt reports no new health changes or concerns today. Port needle left in  per pt request, and per Dr. Butler's order.       Intravenous Access:  Implanted Port.    Treatment Conditions:  Lab Results   Component Value Date    HGB 9.0 (L) 09/04/2024    WBC 6.6 09/04/2024    ANEU 2.6 04/13/2020    ANEUTAUTO 4.8 09/04/2024     09/04/2024        Lab Results   Component Value Date     09/04/2024    POTASSIUM 4.1 09/04/2024    MAG 2.0 06/29/2023    CR 1.38 (H) 09/04/2024    STARR 10.1 09/04/2024    BILITOTAL 0.5 09/04/2024    ALBUMIN 4.1 09/04/2024    ALT 25 09/04/2024    AST 20 09/04/2024       Results reviewed, labs MET treatment parameters, ok to proceed with treatment.      Post Infusion Assessment:  Patient tolerated infusion without incident.  Blood return noted pre and post infusion.  Site patent and intact, free from redness, edema or discomfort.  No evidence of extravasations.  Access not  discontinued per pt request, as pt has another day of infusion tomorrow.       Discharge Plan:   Discharge instructions reviewed with: Patient.  Patient and/or family verbalized understanding of discharge instructions and all questions answered.  AVS to patient via SmartCare systemHART.  Patient will return 9/6/24 for next appointment.   Patient discharged in stable condition accompanied by: self and son.  Departure Mode: Ambulatory+cane.      Kelly Weeks RN

## 2024-11-03 DIAGNOSIS — F33.1 MODERATE EPISODE OF RECURRENT MAJOR DEPRESSIVE DISORDER (CMS/HCC): Primary | ICD-10-CM

## 2024-11-05 RX ORDER — SERTRALINE HYDROCHLORIDE 100 MG/1
200 TABLET, FILM COATED ORAL DAILY
Qty: 180 TABLET | Refills: 0 | Status: SHIPPED | OUTPATIENT
Start: 2024-11-05

## 2024-11-05 NOTE — TELEPHONE ENCOUNTER
"Medicine Refill Request    Last Office Visit: 5/3/2024   Last Consult Visit: Visit date not found  Last Telemedicine Visit: 8/7/2024 Ester Gomez DO    Next Appointment: Visit date not found      Current Outpatient Medications:     ARIPiprazole (ABILIFY) 5 mg tablet, Take 1 tablet (5 mg total) by mouth daily., Disp: 90 tablet, Rfl: 0    azithromycin (ZITHROMAX) 250 mg tablet, Take 2 tablets the first day, then 1 tablet daily for 4 days., Disp: 6 tablet, Rfl: 0    norethindrone-e.estradioL-iron (JUNEL FE 1/20, 28,) 1 mg-20 mcg (21)/75 mg (7) per tablet, Take 1 tablet by mouth daily., Disp: 84 tablet, Rfl: 3    sertraline (ZOLOFT) 100 mg tablet, Take 2 tablets (200 mg total) by mouth daily., Disp: 180 tablet, Rfl: 0      BP Readings from Last 3 Encounters:   05/03/24 102/64   03/20/24 110/64   02/26/24 102/72       Recent Lab results:  No results found for: \"CHOL\", No results found for: \"HDL\", No results found for: \"LDLCALC\", No results found for: \"TRIG\"     Lab Results   Component Value Date    GLUCOSE NEGATIVE 01/17/2024   , No results found for: \"HGBA1C\"      Lab Results   Component Value Date    CREATININE 0.64 04/17/2023       Lab Results   Component Value Date    TSH 0.81 04/17/2023         No results found for: \"HGBA1C\"   "

## 2024-11-12 DIAGNOSIS — F33.1 MODERATE EPISODE OF RECURRENT MAJOR DEPRESSIVE DISORDER (CMS/HCC): Primary | ICD-10-CM

## 2024-11-13 NOTE — TELEPHONE ENCOUNTER
"Medicine Refill Request    Last Office Visit: 5/3/2024   Last Consult Visit: Visit date not found  Last Telemedicine Visit: 8/7/2024 Ester Gomez DO    Next Appointment: Visit date not found      Current Outpatient Medications:     ARIPiprazole (ABILIFY) 5 mg tablet, Take 1 tablet (5 mg total) by mouth daily., Disp: 90 tablet, Rfl: 0    azithromycin (ZITHROMAX) 250 mg tablet, Take 2 tablets the first day, then 1 tablet daily for 4 days., Disp: 6 tablet, Rfl: 0    norethindrone-e.estradioL-iron (JUNEL FE 1/20, 28,) 1 mg-20 mcg (21)/75 mg (7) per tablet, Take 1 tablet by mouth daily., Disp: 84 tablet, Rfl: 3    sertraline (ZOLOFT) 100 mg tablet, TAKE 2 TABLETS BY MOUTH EVERY DAY, Disp: 180 tablet, Rfl: 0      BP Readings from Last 3 Encounters:   05/03/24 102/64   03/20/24 110/64   02/26/24 102/72       Recent Lab results:  No results found for: \"CHOL\", No results found for: \"HDL\", No results found for: \"LDLCALC\", No results found for: \"TRIG\"     Lab Results   Component Value Date    GLUCOSE NEGATIVE 01/17/2024   , No results found for: \"HGBA1C\"      Lab Results   Component Value Date    CREATININE 0.64 04/17/2023       Lab Results   Component Value Date    TSH 0.81 04/17/2023         No results found for: \"HGBA1C\"  "

## 2024-11-15 RX ORDER — ARIPIPRAZOLE 5 MG/1
5 TABLET ORAL DAILY
Qty: 90 TABLET | Refills: 0 | Status: SHIPPED | OUTPATIENT
Start: 2024-11-15 | End: 2025-03-19 | Stop reason: HOSPADM

## 2025-02-05 ENCOUNTER — HOSPITAL ENCOUNTER (OUTPATIENT)
Facility: HOSPITAL | Age: 30
End: 2025-02-05
Attending: STUDENT IN AN ORGANIZED HEALTH CARE EDUCATION/TRAINING PROGRAM | Admitting: STUDENT IN AN ORGANIZED HEALTH CARE EDUCATION/TRAINING PROGRAM
Payer: MEDICARE

## 2025-02-07 DIAGNOSIS — F33.1 MODERATE EPISODE OF RECURRENT MAJOR DEPRESSIVE DISORDER (CMS/HCC): ICD-10-CM

## 2025-02-07 RX ORDER — SERTRALINE HYDROCHLORIDE 100 MG/1
200 TABLET, FILM COATED ORAL DAILY
Qty: 180 TABLET | Refills: 0 | OUTPATIENT
Start: 2025-02-07

## 2025-02-07 NOTE — TELEPHONE ENCOUNTER
A message was left for patient to instruct her per Dr Gomez that an office visit would be required for any further refills and to please call.

## 2025-03-07 LAB
GP B STREP SPEC QL CULT: ABNORMAL
HBV SURFACE AG SER QL: NONREACTIVE

## 2025-03-17 ENCOUNTER — ANESTHESIA (INPATIENT)
Dept: OBSTETRICS AND GYNECOLOGY | Facility: HOSPITAL | Age: 30
End: 2025-03-17
Payer: COMMERCIAL

## 2025-03-17 ENCOUNTER — HOSPITAL ENCOUNTER (INPATIENT)
Facility: HOSPITAL | Age: 30
LOS: 2 days | Discharge: HOME | End: 2025-03-19
Attending: STUDENT IN AN ORGANIZED HEALTH CARE EDUCATION/TRAINING PROGRAM | Admitting: STUDENT IN AN ORGANIZED HEALTH CARE EDUCATION/TRAINING PROGRAM
Payer: COMMERCIAL

## 2025-03-17 ENCOUNTER — ANESTHESIA EVENT (INPATIENT)
Dept: OBSTETRICS AND GYNECOLOGY | Facility: HOSPITAL | Age: 30
End: 2025-03-17
Payer: COMMERCIAL

## 2025-03-17 DIAGNOSIS — O34.211 MATERNAL CARE DUE TO LOW TRANSVERSE UTERINE SCAR FROM PREVIOUS CESAREAN DELIVERY: ICD-10-CM

## 2025-03-17 PROBLEM — Z98.891 STATUS POST REPEAT LOW TRANSVERSE CESAREAN SECTION: Status: ACTIVE | Noted: 2025-03-17

## 2025-03-17 PROBLEM — E66.9 OBESITY: Status: ACTIVE | Noted: 2024-05-03

## 2025-03-17 PROBLEM — O60.00 PRETERM LABOR: Status: ACTIVE | Noted: 2025-03-17

## 2025-03-17 LAB
ABO + RH BLD: NORMAL
ABO + RH BLD: NORMAL
AMPHET UR QL SCN: NOT DETECTED
AMPHET UR QL SCN: NOT DETECTED
BARBITURATES UR QL SCN: NOT DETECTED
BARBITURATES UR QL SCN: NOT DETECTED
BENZODIAZ UR QL SCN: NOT DETECTED
BENZODIAZ UR QL SCN: NOT DETECTED
BLD GP AB SCN SERPL QL: NEGATIVE
BUPRENORPHINE UR QL: NOT DETECTED
BUPRENORPHINE UR QL: NOT DETECTED
CANNABINOIDS UR QL SCN: POSITIVE
CANNABINOIDS UR QL SCN: POSITIVE
COCAINE UR QL SCN: NOT DETECTED
COCAINE UR QL SCN: NOT DETECTED
D AG BLD QL: POSITIVE
D AG BLD QL: POSITIVE
ERYTHROCYTE [DISTWIDTH] IN BLOOD BY AUTOMATED COUNT: 14.1 % (ref 11.7–14.4)
ETHANOL UR-MCNC: NEGATIVE MG/DL
FENTANYL CTO UR SCN-MCNC: NOT DETECTED NG/ML
FENTANYL CTO UR SCN-MCNC: POSITIVE NG/ML
HBV SURFACE AG SER QL: NONREACTIVE
HCT VFR BLD AUTO: 33.1 % (ref 35–45)
HGB BLD-MCNC: 11.1 G/DL (ref 11.8–15.7)
LABORATORY COMMENT REPORT: NORMAL
MCH RBC QN AUTO: 28.5 PG (ref 28–33.2)
MCHC RBC AUTO-ENTMCNC: 33.5 G/DL (ref 32.2–35.5)
MCV RBC AUTO: 84.9 FL (ref 83–98)
METHADONE UR QL SCN: NOT DETECTED
METHADONE UR QL SCN: NOT DETECTED
OPIATES UR QL SCN: NOT DETECTED
OPIATES UR QL SCN: NOT DETECTED
OXYCODONE UR QL SCN: NOT DETECTED
OXYCODONE UR QL SCN: NOT DETECTED
PCP UR QL SCN: NOT DETECTED
PCP UR QL SCN: NOT DETECTED
PLATELET # BLD AUTO: 309 K/UL (ref 150–369)
PMV BLD AUTO: 10.2 FL (ref 9.4–12.3)
RBC # BLD AUTO: 3.9 M/UL (ref 3.93–5.22)
SPECIMEN EXP DATE BLD: NORMAL
T PALLIDUM AB SER QL IF: NONREACTIVE
WBC # BLD AUTO: 21.4 K/UL (ref 3.8–10.5)

## 2025-03-17 PROCEDURE — 36415 COLL VENOUS BLD VENIPUNCTURE: CPT | Performed by: STUDENT IN AN ORGANIZED HEALTH CARE EDUCATION/TRAINING PROGRAM

## 2025-03-17 PROCEDURE — 25800000 HC PHARMACY IV SOLUTIONS: Performed by: STUDENT IN AN ORGANIZED HEALTH CARE EDUCATION/TRAINING PROGRAM

## 2025-03-17 PROCEDURE — 71000011 HC PACU PHASE 1 EA ADDL MIN: Performed by: OBSTETRICS & GYNECOLOGY

## 2025-03-17 PROCEDURE — 63700000 HC SELF-ADMINISTRABLE DRUG: Performed by: STUDENT IN AN ORGANIZED HEALTH CARE EDUCATION/TRAINING PROGRAM

## 2025-03-17 PROCEDURE — 71000001 HC PACU PHASE 1 INITIAL 30MIN: Performed by: OBSTETRICS & GYNECOLOGY

## 2025-03-17 PROCEDURE — 63600000 HC DRUGS/DETAIL CODE: Mod: JZ | Performed by: STUDENT IN AN ORGANIZED HEALTH CARE EDUCATION/TRAINING PROGRAM

## 2025-03-17 PROCEDURE — 88302 TISSUE EXAM BY PATHOLOGIST: CPT | Performed by: OBSTETRICS & GYNECOLOGY

## 2025-03-17 PROCEDURE — 86901 BLOOD TYPING SEROLOGIC RH(D): CPT

## 2025-03-17 PROCEDURE — 72000021 HC C SECTION LEVEL 1: Performed by: OBSTETRICS & GYNECOLOGY

## 2025-03-17 PROCEDURE — 25000000 HC PHARMACY GENERAL: Performed by: ANESTHESIOLOGY

## 2025-03-17 PROCEDURE — 80307 DRUG TEST PRSMV CHEM ANLYZR: CPT | Performed by: STUDENT IN AN ORGANIZED HEALTH CARE EDUCATION/TRAINING PROGRAM

## 2025-03-17 PROCEDURE — 85027 COMPLETE CBC AUTOMATED: CPT | Performed by: STUDENT IN AN ORGANIZED HEALTH CARE EDUCATION/TRAINING PROGRAM

## 2025-03-17 PROCEDURE — 87340 HEPATITIS B SURFACE AG IA: CPT | Performed by: STUDENT IN AN ORGANIZED HEALTH CARE EDUCATION/TRAINING PROGRAM

## 2025-03-17 PROCEDURE — 27200121 HC CATH FOLEY

## 2025-03-17 PROCEDURE — 25000000 HC PHARMACY GENERAL: Performed by: STUDENT IN AN ORGANIZED HEALTH CARE EDUCATION/TRAINING PROGRAM

## 2025-03-17 PROCEDURE — 86780 TREPONEMA PALLIDUM: CPT | Performed by: STUDENT IN AN ORGANIZED HEALTH CARE EDUCATION/TRAINING PROGRAM

## 2025-03-17 PROCEDURE — 63600000 HC DRUGS/DETAIL CODE: Mod: JZ | Performed by: ANESTHESIOLOGY

## 2025-03-17 PROCEDURE — 12000000 HC ROOM AND CARE MED/SURG

## 2025-03-17 PROCEDURE — 0UT70ZZ RESECTION OF BILATERAL FALLOPIAN TUBES, OPEN APPROACH: ICD-10-PCS | Performed by: STUDENT IN AN ORGANIZED HEALTH CARE EDUCATION/TRAINING PROGRAM

## 2025-03-17 PROCEDURE — 25800000 HC PHARMACY IV SOLUTIONS: Performed by: ANESTHESIOLOGY

## 2025-03-17 PROCEDURE — 37000010 HC ANESTHESIA SPINAL: Performed by: OBSTETRICS & GYNECOLOGY

## 2025-03-17 PROCEDURE — G0480 DRUG TEST DEF 1-7 CLASSES: HCPCS | Performed by: STUDENT IN AN ORGANIZED HEALTH CARE EDUCATION/TRAINING PROGRAM

## 2025-03-17 RX ORDER — METHYLERGONOVINE MALEATE 0.2 MG/ML
0.2 INJECTION INTRAVENOUS ONCE AS NEEDED
Status: DISCONTINUED | OUTPATIENT
Start: 2025-03-17 | End: 2025-03-17

## 2025-03-17 RX ORDER — ONDANSETRON HYDROCHLORIDE 2 MG/ML
4 INJECTION, SOLUTION INTRAVENOUS EVERY 8 HOURS PRN
Status: DISCONTINUED | OUTPATIENT
Start: 2025-03-17 | End: 2025-03-19 | Stop reason: HOSPADM

## 2025-03-17 RX ORDER — ACETAMINOPHEN 325 MG/1
975 TABLET ORAL ONCE
Status: COMPLETED | OUTPATIENT
Start: 2025-03-17 | End: 2025-03-17

## 2025-03-17 RX ORDER — OXYTOCIN/RINGER'S LACTATE 30/500 ML
PLASTIC BAG, INJECTION (ML) INTRAVENOUS CONTINUOUS PRN
Status: DISCONTINUED | OUTPATIENT
Start: 2025-03-17 | End: 2025-03-17 | Stop reason: SURG

## 2025-03-17 RX ORDER — SODIUM CHLORIDE, SODIUM LACTATE, POTASSIUM CHLORIDE, CALCIUM CHLORIDE 600; 310; 30; 20 MG/100ML; MG/100ML; MG/100ML; MG/100ML
150 INJECTION, SOLUTION INTRAVENOUS CONTINUOUS
Status: DISCONTINUED | OUTPATIENT
Start: 2025-03-17 | End: 2025-03-17

## 2025-03-17 RX ORDER — AMOXICILLIN 250 MG
1 CAPSULE ORAL 2 TIMES DAILY
Status: DISCONTINUED | OUTPATIENT
Start: 2025-03-17 | End: 2025-03-19 | Stop reason: HOSPADM

## 2025-03-17 RX ORDER — BUPIVACAINE HYDROCHLORIDE 7.5 MG/ML
INJECTION INTRAVENOUS
Status: COMPLETED | OUTPATIENT
Start: 2025-03-17 | End: 2025-03-17

## 2025-03-17 RX ORDER — ACETAMINOPHEN 325 MG/1
975 TABLET ORAL
Status: DISCONTINUED | OUTPATIENT
Start: 2025-03-17 | End: 2025-03-19 | Stop reason: HOSPADM

## 2025-03-17 RX ORDER — ACETAMINOPHEN 650 MG/20.3ML
1000 LIQUID ORAL ONCE
Status: COMPLETED | OUTPATIENT
Start: 2025-03-17 | End: 2025-03-17

## 2025-03-17 RX ORDER — MORPHINE SULFATE 0.5 MG/ML
INJECTION, SOLUTION EPIDURAL; INTRATHECAL; INTRAVENOUS
Status: COMPLETED | OUTPATIENT
Start: 2025-03-17 | End: 2025-03-17

## 2025-03-17 RX ORDER — OXYTOCIN/0.9 % SODIUM CHLORIDE 30/500 ML
83 PLASTIC BAG, INJECTION (ML) INTRAVENOUS CONTINUOUS
Status: DISCONTINUED | OUTPATIENT
Start: 2025-03-17 | End: 2025-03-17

## 2025-03-17 RX ORDER — SERTRALINE HYDROCHLORIDE 100 MG/1
200 TABLET, FILM COATED ORAL DAILY
Status: DISCONTINUED | OUTPATIENT
Start: 2025-03-18 | End: 2025-03-18

## 2025-03-17 RX ORDER — DIPHENHYDRAMINE HCL 50 MG/ML
25 VIAL (ML) INJECTION EVERY 6 HOURS PRN
Status: DISCONTINUED | OUTPATIENT
Start: 2025-03-17 | End: 2025-03-19 | Stop reason: HOSPADM

## 2025-03-17 RX ORDER — DEXAMETHASONE SODIUM PHOSPHATE 4 MG/ML
INJECTION, SOLUTION INTRA-ARTICULAR; INTRALESIONAL; INTRAMUSCULAR; INTRAVENOUS; SOFT TISSUE AS NEEDED
Status: DISCONTINUED | OUTPATIENT
Start: 2025-03-17 | End: 2025-03-17 | Stop reason: SURG

## 2025-03-17 RX ORDER — ACETAMINOPHEN 650 MG/1
650 SUPPOSITORY RECTAL ONCE
Status: COMPLETED | OUTPATIENT
Start: 2025-03-17 | End: 2025-03-17

## 2025-03-17 RX ORDER — SODIUM CITRATE AND CITRIC ACID MONOHYDRATE 334; 500 MG/5ML; MG/5ML
30 SOLUTION ORAL ONCE
Status: COMPLETED | OUTPATIENT
Start: 2025-03-17 | End: 2025-03-17

## 2025-03-17 RX ORDER — DIPHENHYDRAMINE HCL 25 MG
25 CAPSULE ORAL EVERY 6 HOURS PRN
Status: DISCONTINUED | OUTPATIENT
Start: 2025-03-17 | End: 2025-03-19 | Stop reason: HOSPADM

## 2025-03-17 RX ORDER — FENTANYL CITRATE 50 UG/ML
INJECTION, SOLUTION INTRAMUSCULAR; INTRAVENOUS
Status: COMPLETED | OUTPATIENT
Start: 2025-03-17 | End: 2025-03-17

## 2025-03-17 RX ORDER — IBUPROFEN 600 MG/1
600 TABLET ORAL EVERY 6 HOURS PRN
Status: DISCONTINUED | OUTPATIENT
Start: 2025-03-19 | End: 2025-03-19 | Stop reason: HOSPADM

## 2025-03-17 RX ORDER — CLINDAMYCIN PHOSPHATE 900 MG/50ML
900 INJECTION, SOLUTION INTRAVENOUS
Status: COMPLETED | OUTPATIENT
Start: 2025-03-17 | End: 2025-03-17

## 2025-03-17 RX ORDER — CALCIUM CARBONATE 200(500)MG
200 TABLET,CHEWABLE ORAL EVERY 4 HOURS PRN
Status: DISCONTINUED | OUTPATIENT
Start: 2025-03-17 | End: 2025-03-19 | Stop reason: HOSPADM

## 2025-03-17 RX ORDER — ONDANSETRON 4 MG/1
4 TABLET, ORALLY DISINTEGRATING ORAL EVERY 8 HOURS PRN
Status: DISCONTINUED | OUTPATIENT
Start: 2025-03-17 | End: 2025-03-19 | Stop reason: HOSPADM

## 2025-03-17 RX ORDER — OXYCODONE HYDROCHLORIDE 5 MG/1
5 TABLET ORAL EVERY 4 HOURS PRN
Status: DISCONTINUED | OUTPATIENT
Start: 2025-03-17 | End: 2025-03-19 | Stop reason: HOSPADM

## 2025-03-17 RX ORDER — ALUMINUM HYDROXIDE, MAGNESIUM HYDROXIDE, AND SIMETHICONE 1200; 120; 1200 MG/30ML; MG/30ML; MG/30ML
30 SUSPENSION ORAL EVERY 4 HOURS PRN
Status: DISCONTINUED | OUTPATIENT
Start: 2025-03-17 | End: 2025-03-19 | Stop reason: HOSPADM

## 2025-03-17 RX ORDER — OXYTOCIN/0.9 % SODIUM CHLORIDE 30/500 ML
667 PLASTIC BAG, INJECTION (ML) INTRAVENOUS ONCE
Status: DISCONTINUED | OUTPATIENT
Start: 2025-03-17 | End: 2025-03-17

## 2025-03-17 RX ORDER — MISOPROSTOL 200 UG/1
1000 TABLET ORAL ONCE AS NEEDED
Status: DISCONTINUED | OUTPATIENT
Start: 2025-03-17 | End: 2025-03-17

## 2025-03-17 RX ORDER — CARBOPROST TROMETHAMINE 250 UG/ML
250 INJECTION, SOLUTION INTRAMUSCULAR ONCE AS NEEDED
Status: DISCONTINUED | OUTPATIENT
Start: 2025-03-17 | End: 2025-03-17

## 2025-03-17 RX ORDER — ONDANSETRON 8 MG/1
8 TABLET, ORALLY DISINTEGRATING ORAL ONCE
Status: COMPLETED | OUTPATIENT
Start: 2025-03-17 | End: 2025-03-17

## 2025-03-17 RX ORDER — EPHEDRINE SULFATE 50 MG/ML
INJECTION, SOLUTION INTRAVENOUS AS NEEDED
Status: DISCONTINUED | OUTPATIENT
Start: 2025-03-17 | End: 2025-03-17 | Stop reason: SURG

## 2025-03-17 RX ORDER — IBUPROFEN 600 MG/1
600 TABLET ORAL
Status: COMPLETED | OUTPATIENT
Start: 2025-03-17 | End: 2025-03-19

## 2025-03-17 RX ORDER — SODIUM CHLORIDE 9 MG/ML
INJECTION, SOLUTION INTRAVENOUS CONTINUOUS
Status: DISCONTINUED | OUTPATIENT
Start: 2025-03-17 | End: 2025-03-17

## 2025-03-17 RX ORDER — TRANEXAMIC ACID 10 MG/ML
1000 INJECTION, SOLUTION INTRAVENOUS ONCE AS NEEDED
Status: DISCONTINUED | OUTPATIENT
Start: 2025-03-17 | End: 2025-03-17

## 2025-03-17 RX ORDER — OXYTOCIN 10 [USP'U]/ML
10 INJECTION, SOLUTION INTRAMUSCULAR; INTRAVENOUS ONCE AS NEEDED
Status: DISCONTINUED | OUTPATIENT
Start: 2025-03-17 | End: 2025-03-17

## 2025-03-17 RX ORDER — PHENYLEPHRINE HYDROCHLORIDE 10 MG/ML
INJECTION INTRAVENOUS AS NEEDED
Status: DISCONTINUED | OUTPATIENT
Start: 2025-03-17 | End: 2025-03-17 | Stop reason: SURG

## 2025-03-17 RX ORDER — KETOROLAC TROMETHAMINE 30 MG/ML
30 INJECTION, SOLUTION INTRAMUSCULAR; INTRAVENOUS
Status: COMPLETED | OUTPATIENT
Start: 2025-03-17 | End: 2025-03-19

## 2025-03-17 RX ADMIN — EPHEDRINE SULFATE 10 MG: 50 INJECTION, SOLUTION INTRAVENOUS at 12:20

## 2025-03-17 RX ADMIN — SODIUM CHLORIDE: 9 INJECTION, SOLUTION INTRAVENOUS at 11:05

## 2025-03-17 RX ADMIN — GENTAMICIN SULFATE 360 MG: 40 INJECTION, SOLUTION INTRAMUSCULAR; INTRAVENOUS at 17:14

## 2025-03-17 RX ADMIN — FENTANYL CITRATE 15 MCG: 50 INJECTION, SOLUTION INTRAMUSCULAR; INTRAVENOUS at 11:13

## 2025-03-17 RX ADMIN — PHENYLEPHRINE HYDROCHLORIDE 200 MCG: 10 INJECTION INTRAVENOUS at 11:34

## 2025-03-17 RX ADMIN — SODIUM CHLORIDE: 9 INJECTION, SOLUTION INTRAVENOUS at 10:58

## 2025-03-17 RX ADMIN — AZITHROMYCIN DIHYDRATE 500 MG: 500 INJECTION, POWDER, LYOPHILIZED, FOR SOLUTION INTRAVENOUS at 11:23

## 2025-03-17 RX ADMIN — SODIUM CHLORIDE: 9 INJECTION, SOLUTION INTRAVENOUS at 10:00

## 2025-03-17 RX ADMIN — PHENYLEPHRINE HYDROCHLORIDE 50 MCG/MIN: 10 INJECTION INTRAVENOUS at 11:13

## 2025-03-17 RX ADMIN — KETOROLAC TROMETHAMINE 30 MG: 30 INJECTION, SOLUTION INTRAMUSCULAR at 15:02

## 2025-03-17 RX ADMIN — EPHEDRINE SULFATE 10 MG: 50 INJECTION, SOLUTION INTRAVENOUS at 11:36

## 2025-03-17 RX ADMIN — SODIUM CHLORIDE: 9 INJECTION, SOLUTION INTRAVENOUS at 10:59

## 2025-03-17 RX ADMIN — ACETAMINOPHEN 975 MG: 325 TABLET ORAL at 17:13

## 2025-03-17 RX ADMIN — CLINDAMYCIN PHOSPHATE 900 MG: 900 INJECTION, SOLUTION INTRAVENOUS at 11:09

## 2025-03-17 RX ADMIN — ONDANSETRON 8 MG: 8 TABLET, ORALLY DISINTEGRATING ORAL at 10:37

## 2025-03-17 RX ADMIN — Medication: at 11:41

## 2025-03-17 RX ADMIN — DEXAMETHASONE SODIUM PHOSPHATE 8 MG: 4 INJECTION, SOLUTION INTRA-ARTICULAR; INTRALESIONAL; INTRAMUSCULAR; INTRAVENOUS; SOFT TISSUE at 11:33

## 2025-03-17 RX ADMIN — CLINDAMYCIN PHOSPHATE 900 MG: 900 INJECTION, SOLUTION INTRAVENOUS at 11:18

## 2025-03-17 RX ADMIN — SODIUM CITRATE AND CITRIC ACID MONOHYDRATE 30 ML: 500; 334 SOLUTION ORAL at 10:37

## 2025-03-17 RX ADMIN — SENNOSIDES AND DOCUSATE SODIUM 1 TABLET: 50; 8.6 TABLET ORAL at 21:26

## 2025-03-17 RX ADMIN — CALCIUM CARBONATE 200 MG OF ELEMENTAL CALCIUM: 500 TABLET, CHEWABLE ORAL at 23:55

## 2025-03-17 RX ADMIN — ACETAMINOPHEN 975 MG: 325 TABLET ORAL at 23:26

## 2025-03-17 RX ADMIN — KETOROLAC TROMETHAMINE 30 MG: 30 INJECTION, SOLUTION INTRAMUSCULAR at 21:26

## 2025-03-17 RX ADMIN — BUPIVACAINE HYDROCHLORIDE IN DEXTROSE 1.6 ML: 7.5 INJECTION, SOLUTION SUBARACHNOID at 11:13

## 2025-03-17 RX ADMIN — EPHEDRINE SULFATE 10 MG: 50 INJECTION, SOLUTION INTRAVENOUS at 11:59

## 2025-03-17 RX ADMIN — MORPHINE SULFATE 0.15 MG: 0.5 INJECTION, SOLUTION EPIDURAL; INTRATHECAL; INTRAVENOUS at 11:13

## 2025-03-17 RX ADMIN — PHENYLEPHRINE HYDROCHLORIDE 100 MCG: 10 INJECTION INTRAVENOUS at 11:59

## 2025-03-17 RX ADMIN — ACETAMINOPHEN 975 MG: 325 TABLET ORAL at 10:37

## 2025-03-17 NOTE — OR SURGEON
Pre-Procedure patient identification:  I am the primary operating surgeon/proceduralist and I have reviewed the applicable pathology reports and radiology studies for this procedure. I have identified the patient on 03/17/25 at 10:59 AM Veronica Aldana MD  Phone Number: [phone]

## 2025-03-17 NOTE — ANESTHESIA PROCEDURE NOTES
Spinal Block    Patient location during procedure: OR  Start time: 3/17/2025 11:12 AM  End time: 3/17/2025 11:13 AM  Staffing  Anesthesiologist: Jeff Rojas MD  Performed by: Jeff Rojas MD  Authorized by: Jeff Rojas MD    Reason for block: at surgeon's request  Preanesthetic Checklist  Completed: patient identified, surgical consent, pre-op evaluation, timeout performed, IV checked, risks and benefits discussed, monitors and equipment checked and sterile field maintained during procedure  Spinal Block  Patient position: sitting  Prep: ChloraPrep and site prepped and draped  Patient monitoring: heart rate, cardiac monitor, continuous pulse ox and blood pressure  Approach: midline  Location: L3-4  Injection technique: single-shot  Needle  Needle type: Sprotte   Needle gauge: 24 G  Needle length: 3.5 in  Assessment  Events: cerebrospinal fluid  Additional Notes  Procedure well tolerated. Vital signs stable.    Medications Administered -   fentaNYL (PF) (SUBLIMAZE) injection - intrathecal   15 mcg - 3/17/2025 11:13:00 AM  morphine (DURAMORPH) PF injection 0.5 mg/mL - intrathecal   0.15 mg - 3/17/2025 11:13:00 AM  bupivacaine PF (MARCAINE SPINAL) 0.75% intrathecal injection - intrathecal   1.6 mL - 3/17/2025 11:13:00 AM

## 2025-03-17 NOTE — OP NOTE
OB  Delivery OP Note    Date of Procedure: 3/17/2025  Patient:Geneva Roman  :1995    Procedure:    REPEAT  SECTION 4.9.25  CPT(R) Code:  27026 - [place] FULL ROUT OBSTE CARE, DELIV     Review the Delivery Report for details.      Details    Pre-Op Diagnosis: 1. 29 y.o.  Intrauterine pregnancy at 36w5d  2.  Labor  3. Anxiety/Depression/Bipolar-zoloft 100mg  4. Varicella Non-Immune, needs PP vaccination  5. GBS positive  6. Late to Prenatal Care  7. 1hr Elevated, no 3hr GTT   8. History of Prior  Section (elective), desire for permanent sterilization   Post-Op Diagnosis: 1. S/p Repeat  Section, Bilateral Salpingectomy   Indication:  Labor   Procedure: repeat , Low Transverse via low transverseuterine incision and Pfannenstielskin incision.   Anesthesia: Spinal   Findings: Normal uterus, tubes, and ovaries.   EBL  800cc   Complications: None     Specimen Information:  ID Type Source Tests Collected by Time Destination   1 : R fallopin tube Tissue Fallopian Tube, Right PATHOLOGY TISSUE EXAM Veronica Aldana MD 3/17/2025 1115    2 : L fallopian tube Tissue Fallopian Tube, Left PATHOLOGY TISSUE EXAM Veronica Aldana MD 3/17/2025 1115      Drains: Posadas draining clear    Staff:  Surgeon(s):  Veronica Aldana MD Carrasco Arias, Margaret S, MD  Anesthesiologist: Jeff Rojas MD   Circulator: Lynette Loera RN  Scrub Person: Connie Courtney TECH; Fer Cesar RN  Baby Nurse: Adrienne Morris RN; Shawna Odom, RN  Registered Nurse First Assistant: Alyse Sanchez RN  Neonatologist: Tatianna Mcgovern MD  Other Staff:  LYNETTE LOERA;ADRIENNE MORRIS;SHAWNA ODOM;TATIANNA MCGOVERN Delivery Assist;Delivery Nurse;Nursery Nurse;    INFANT INFORMATION  Time of Birth:11:39 AM  Presentation: Vertex  Position:Middle,Occiput,Posterior,   Cord: 3  vessels,Complications:None  [place] Sex: female   Weight: 2.985 kg (6 lb 9.3 oz)     1 Minute 5 Minute 10 Minute   Apgar Totals: 9   9          Information for the patient's :  Pierre Roman [[id number]]     [place] Cord Gas       None            Indications  Geneva Roman is a 29 y.o.  at 36w5d who presented  contractions found to be 4cm dilated and berto q4 on tocometer. She became increasingly uncomfortable and progressed to 5cm with rupture of membranes to clear fluid. She had a history of prior  section at 35w for PPROM and desired repeat as well as bilateral salpingectomy      Findings:  Normal appearing uterus, fallopian tubes, and ovaries.  Moderate adhesive disease below the level of the fascia.      Procedure Details:  The patient was taken to the operating room where Spinal anesthesia was placed and found to be adequate. Antibiotics were given for infection prophylaxis. The patient was prepped and draped in the normal sterile fashion.  A timeout was performed.  A Pfannenstiel skin incision was made with the scalpel. The incision was carried down to the fascia using the scalpel. The fascia was incised and extended laterally with delgado scissors. The fascia was grasped with Kocher clamps and the underlying rectus muscle was dissected off.  The rectus muscles were  bluntly. The peritoneum was identified and entered bluntly. The peritoneum was dissected to allow for adequate visualization.     The bladder blade was inserted. The vesicouterine peritoneum was identified and  a bladder flap created sharply, though the bladder was noted to be densely adhered to the lower uterine segment.. The lower uterine segment was then incised with a low transverse incision and was extended bluntly. The amniotic sac was ruptured and  clear fluid noted. The bladder blade was removed and the infant's head was delivered atraumatically using the usual maneuvers. The  remainder of the infant was delivered, a spontaneous cry was heard, and the infant appeared to be moving all 4 extremities. The cord clamped and cut, and the baby was handed off to the awaiting clinicians and neonatology staff.    The placenta was removed manually. The uterus was then exteriorized and cleared of all clots and debris. The hysterotomy was repaired with 0 Vicryl in a running locked fashion. A second layer of the same suture was used in an imbricated fashion along the left lateral edge of the hysterotomy where increased bleeding was noted. Several figure of eights were placed along the midline for additional hemostasis. The ovaries and tubes appeared normal bilaterally.     Attention was turned the right fallopian tube which was positively identified and followed to it's fimbriated end. The Ligasure device was then used to clamp, coagulate and transect the mesosalpinx along the length of the fallopian tube. The surgical site was hemostatic. Attention was then turned to the left fallopian tube which was also followed to it's fimbriated end. The Ligasure device was used to clamp, coagulate and transect the right fallopian tube along the length of the mesosalpinx. The surgical site was hemostatic.    The uterus was then returned to the abdomen. The uterine incision was reinspected and found to be hemostatic. The gutters were inspected and cleared of all clots and debris. The hysterotomy was reinspected and oozing was noted. Additional figure of eight were placed using 0 Vicryl and bella powder was applied along the hysterotomy. The fascia was then reapproximated with a running suture of 0 PDS. The subcutaneous tissue was irrigated and rendered hemostatic. The subcutaneous tissue was re-approximated with 2-0 Plain Gut. The skin was closed with 4-0 Monocryl.     The patient tolerated the procedure well. The sponge, instrument, and needle counts were correct and the patient was taken to recovery in stable  condition.      Attending Attestation: I was present and scrubbed for the entire procedure.    Joseline Galo MD

## 2025-03-17 NOTE — ANESTHESIA PREPROCEDURE EVALUATION
Anesthesia ROS/MED HX    Anesthesia History - neg      Relevant Problems   GASTROINTESTINAL   (+) Gastroesophageal reflux disease      NEUROLOGY   (+) ALIREZA (generalized anxiety disorder)   (+) Moderate episode of recurrent major depressive disorder (CMS/HCC)   (+) PTSD (post-traumatic stress disorder)      RESPIRATORY SYSTEM   (+) Smoker      Other   (+) Hypothyroidism       Physical Exam    Airway   Mallampati: I   TM distance: <3 FB   Neck ROM: full  Cardiovascular - normal   Rhythm: regular   Rate: normalPulmonary - normal   clear to auscultation  Other Findings   Back - neg    landmarks identified          Anesthesia Plan    Plan: spinal    Technique: spinal     Lines and Monitors: PIV     Airway: natural airway / supplemental oxygen    2 ASA

## 2025-03-17 NOTE — ANESTHESIOLOGIST PRE-PROCEDURE ATTESTATION
Pre-Procedure Patient Identification:  I am the Primary Anesthesiologist and have identified the patient on 03/17/25 at 10:47 AM.   I have confirmed the procedure(s) will be performed by the following surgeon/proceduralist Veronica Aldana MD.

## 2025-03-17 NOTE — H&P
Pre-Operative History and Physical    History of Present Illness  Subjective     Geneva Roman is a 29 y.o. female  at 36w5d with an estimated due date of 2025, by 3rd trimester US who presents with  contractions, found to be in  labor.    Patient reports contractions that began at 0700 that have increased in intensity and frequency. She reports contractions initially q10min around 8am, since 0900, contractions have been occurring q5min. While in L&D, contractions have continued to become more intense and more frequent. She reports some degree of spotting. She denies leakage of fluid. Endorses good fetal movement.      She has history of PPROM at 35w with  labor, delivered via elective primary  section. She desires repeat  section, declines TOLAC    Discussed Blood/Blood Products: yes    Prenatal problems:  1. Hx PPROM at 35.5wks, elective primary c/s. desires repeat c/s with tubal (consent signed)  2. Hx anxiety/depression/bipolar- Zoloft 100mg, managed by psychiatrist  3. Varicella Non-Immune- vaccinate PP  4. GBS pos  5. Late to prenatal care at 30w  6. Elevated 1hr glucola (156), no 3hr GTT    OB History:   OB History    Para Term  AB Living   2 1 0 1 0 1   SAB IAB Ectopic Multiple Live Births   0 0 0 0 1      # Outcome Date GA Lbr Jordi/2nd Weight Sex Type Anes PTL Lv   2 Current            1  23    M CS-LTranv Spinal Y JUSTIN       Medical History:   Past Medical History:   Diagnosis Date    ADHD     Anxiety     Decreased libido     Depression     Endometriosis     Heartburn     Hyponatremia     Plantar fasciitis     Snoring     Thyroid disease        Surgical History:   Past Surgical History   Procedure Laterality Date     section      Laparoscopic endometriosis fulguration      2016 AND 2018    [place] tooth extraction         Allergies: Atomoxetine, Augmentin [amoxicillin-pot clavulanate], Doxycycline, and  "Quetiapine    Medications:   Prior to Admission medications    Medication Sig Start Date End Date Taking? Authorizing Provider   prenatal vit no.130-iron-folic 27 mg iron- 800 mcg tablet tablet Take 1 tablet by mouth daily.   Yes Provider, Lindy Coyne MD   sertraline (ZOLOFT) 100 mg tablet TAKE 2 TABLETS BY MOUTH EVERY DAY 24  Yes Ester Gomez DO   ARIPiprazole (ABILIFY) 5 mg tablet TAKE 1 TABLET (5 MG TOTAL) BY MOUTH DAILY. 11/15/24 2/13/25  Ester Gomez DO   azithromycin (ZITHROMAX) 250 mg tablet Take 2 tablets the first day, then 1 tablet daily for 4 days. 24   Ester Gomez DO   norethindrone-e.estradioL-iron (JUNEL FE 1/20, 28,) 1 mg-20 mcg (21)/75 mg (7) per tablet Take 1 tablet by mouth daily. 24  Ester Gomez DO            Objective:  Vitals:    25 1037   BP:    Pulse:    Resp:    Temp: 36.4 °C (97.5 °F)   SpO2:        General:   alert, appears stated age and cooperative   Abdomen:  normal findings: gravid, soft, non-tender   Pelvis:  Mucous-pink tinged discharge, neg pooling, neg nitrazine   FHT: Reactive     SVE 4/50/-3,  [place], berto q3-4 on toco    Labs:    No results found for: \"ABO\", \"LABRH\", \"RUBELLAIGGQT\", \"GBS\"    Rubella Immune/Varicella Non-Immune  Rh: A Pos/Neg antibodies  GBS: Positive  HIV/Syphilis/Hep B/Hep C Negative     Ultrasound:  Dated by 29w US with normal anatomy    Lab Results   Component Value Date    WBC 21.40 (H) 2025    HGB 11.1 (L) 2025    HCT 33.1 (L) 2025    MCV 84.9 2025     2025         Assessment/Plan     Geneva Roman is a 29 y.o. female  at 36w5d with an estimated due date of 2025, by 3rd trimester US who presents with painful contractions found to be in early labor, planned for repeat  section and bilateral salpingectomy    Indication: History of Prior  Section, desires repeat, desire for sterilization,  Labor, Hx of  " Delivery      Counseling: Reviewed risks and benefits of  section. Discussed risks of bleeding, infection, fetal injury, damage to nearby vessels/organs, risks to future surgeries/pregnancies, including uterine rupture and abnormal placentation. Patient verbalized understanding. Informed consent obtained. Gent/Clinda/Azithro ordered for antibiotic ppx. Patient desires to proceed with  section.  Patient also desires bilateral salpingectomy for permanent sterilization. We discussed specific risks of bilateral salpingectomy including risk of failure, risk of ectopic pregnancy, risk of regret, and need for IVF for future pregnancy if desired.       Joseline Galo MD  3/17/2025  10:58 AM

## 2025-03-17 NOTE — ANESTHESIA POSTPROCEDURE EVALUATION
"Patient: Geneva Roman    Procedure Summary       Date: 25 Room / Location: [facility] L&D OR    Anesthesia Start: 1105 Anesthesia Stop: 125    Procedure: REPEAT  SECTION 25 Diagnosis:       Maternal care due to low transverse uterine scar from previous  delivery      (repeat)    Surgeons: Veronica Aldana MD Responsible Provider: Jeff Rojas MD    Anesthesia Type: spinal ASA Status: 2            Anesthesia Type: spinal  PACU Vitals  3/17/2025 1243 - 3/17/2025 1343        3/17/2025  1300             Temp: 36.2 °C (97.2 °F)    Resp: 18          Blood pressure 110/70, pulse 75, temperature 36.2 °C (97.2 °F), temperature source Axillary, resp. rate 18, height 1.676 m (5' 6\"), weight 92.5 kg (204 lb), SpO2 (!) 80%, not currently breastfeeding.      Anesthesia Post Evaluation    Pain management: adequate  Patient location during evaluation: PACU  Patient participation: complete - patient participated  Level of consciousness: awake and alert  Cardiovascular status: acceptable  Airway Patency: adequate  Respiratory status: acceptable  Hydration status: acceptable  Anesthetic complications: no      "

## 2025-03-18 LAB
CASE RPRT: NORMAL
CLINICAL INFO: NORMAL
ERYTHROCYTE [DISTWIDTH] IN BLOOD BY AUTOMATED COUNT: 14.5 % (ref 11.7–14.4)
HCT VFR BLD AUTO: 25.7 % (ref 35–45)
HGB BLD-MCNC: 8.4 G/DL (ref 11.8–15.7)
MCH RBC QN AUTO: 29 PG (ref 28–33.2)
MCHC RBC AUTO-ENTMCNC: 32.7 G/DL (ref 32.2–35.5)
MCV RBC AUTO: 88.6 FL (ref 83–98)
PATH REPORT.FINAL DX SPEC: NORMAL
PATH REPORT.GROSS SPEC: NORMAL
PLATELET # BLD AUTO: 251 K/UL (ref 150–369)
PMV BLD AUTO: 10.6 FL (ref 9.4–12.3)
RBC # BLD AUTO: 2.9 M/UL (ref 3.93–5.22)
WBC # BLD AUTO: 20.2 K/UL (ref 3.8–10.5)

## 2025-03-18 PROCEDURE — 12000000 HC ROOM AND CARE MED/SURG

## 2025-03-18 PROCEDURE — 85027 COMPLETE CBC AUTOMATED: CPT | Performed by: STUDENT IN AN ORGANIZED HEALTH CARE EDUCATION/TRAINING PROGRAM

## 2025-03-18 PROCEDURE — 25800000 HC PHARMACY IV SOLUTIONS: Performed by: OBSTETRICS & GYNECOLOGY

## 2025-03-18 PROCEDURE — 63600000 HC DRUGS/DETAIL CODE: Mod: JZ | Performed by: STUDENT IN AN ORGANIZED HEALTH CARE EDUCATION/TRAINING PROGRAM

## 2025-03-18 PROCEDURE — 63700000 HC SELF-ADMINISTRABLE DRUG: Performed by: STUDENT IN AN ORGANIZED HEALTH CARE EDUCATION/TRAINING PROGRAM

## 2025-03-18 PROCEDURE — 63700000 HC SELF-ADMINISTRABLE DRUG: Performed by: OBSTETRICS & GYNECOLOGY

## 2025-03-18 PROCEDURE — 63600000 HC DRUGS/DETAIL CODE: Mod: JZ | Performed by: OBSTETRICS & GYNECOLOGY

## 2025-03-18 PROCEDURE — 63700000 HC SELF-ADMINISTRABLE DRUG

## 2025-03-18 PROCEDURE — 36415 COLL VENOUS BLD VENIPUNCTURE: CPT | Performed by: STUDENT IN AN ORGANIZED HEALTH CARE EDUCATION/TRAINING PROGRAM

## 2025-03-18 RX ORDER — HYDROXYZINE HYDROCHLORIDE 25 MG/1
25 TABLET, FILM COATED ORAL 3 TIMES DAILY PRN
Status: DISCONTINUED | OUTPATIENT
Start: 2025-03-18 | End: 2025-03-19 | Stop reason: HOSPADM

## 2025-03-18 RX ORDER — SERTRALINE HYDROCHLORIDE 100 MG/1
100 TABLET, FILM COATED ORAL DAILY
Status: DISCONTINUED | OUTPATIENT
Start: 2025-03-18 | End: 2025-03-19 | Stop reason: HOSPADM

## 2025-03-18 RX ADMIN — ACETAMINOPHEN 975 MG: 325 TABLET ORAL at 23:40

## 2025-03-18 RX ADMIN — SENNOSIDES AND DOCUSATE SODIUM 1 TABLET: 50; 8.6 TABLET ORAL at 08:57

## 2025-03-18 RX ADMIN — ACETAMINOPHEN 975 MG: 325 TABLET ORAL at 12:00

## 2025-03-18 RX ADMIN — KETOROLAC TROMETHAMINE 30 MG: 30 INJECTION, SOLUTION INTRAMUSCULAR at 15:00

## 2025-03-18 RX ADMIN — SENNOSIDES AND DOCUSATE SODIUM 1 TABLET: 50; 8.6 TABLET ORAL at 20:32

## 2025-03-18 RX ADMIN — PRENATAL VITAMINS-IRON FUMARATE 27 MG IRON-FOLIC ACID 0.8 MG TABLET 1 TABLET: at 08:57

## 2025-03-18 RX ADMIN — IBUPROFEN 600 MG: 600 TABLET, FILM COATED ORAL at 21:36

## 2025-03-18 RX ADMIN — KETOROLAC TROMETHAMINE 30 MG: 30 INJECTION, SOLUTION INTRAMUSCULAR at 08:57

## 2025-03-18 RX ADMIN — KETOROLAC TROMETHAMINE 30 MG: 30 INJECTION, SOLUTION INTRAMUSCULAR at 02:35

## 2025-03-18 RX ADMIN — ACETAMINOPHEN 975 MG: 325 TABLET ORAL at 05:19

## 2025-03-18 RX ADMIN — SERTRALINE HYDROCHLORIDE 100 MG: 100 TABLET ORAL at 12:00

## 2025-03-18 RX ADMIN — HYDROXYZINE HYDROCHLORIDE 25 MG: 25 TABLET ORAL at 21:29

## 2025-03-18 RX ADMIN — IRON SUCROSE 200 MG: 20 INJECTION, SOLUTION INTRAVENOUS at 12:30

## 2025-03-18 RX ADMIN — ACETAMINOPHEN 975 MG: 325 TABLET ORAL at 17:00

## 2025-03-18 NOTE — PROGRESS NOTES
MSW met with patient at bedside and reviewed patient's chart.  MSW introduced self and explained role.    Patient verified she wanted her partner to stay for assessment.  Patient resides with her partner, their son- Yandy, and her partner's parents.  Patient has all necessary supplies at home for the baby and transportation to all medical appointments.  Baby's name is [facility]  Patient will be added to CH[employer] insurance, patient is aware of how to do so.  Patient also collects food stamps.  Patient denies needing any additional community resources at this time, in laws assist as needed as well.      Patient already collects WIC for her oldest child, will call to update them on the birth of the baby.  Patient confirms a hx of mental health.  Patient is currently prescribed Zoloft through her psychiatrist.  Patient feels well overall on the medication.  Patient explained she has been feeling good overall through out her pregnancy, denies her mental health symptoms have worsened.  Patient confirms a history of PPD following the birth of her first son, declines any inpatient hospitalizations.  Patient identified that her situation was very different following the birth of her first child, she explained that she and her partner were living in an apartment in [place] that was not stable and her son has medical complications following his discharge from the hospital.  Patient is in a much better place living with her in-laws and has good support.  Patient has had a much better experience during her pregnancy this time around.  Patient feels confident following discharge with the psychiatric supports she has established.  MSW provided additional list of outpatient MH supports incase patient is in need of additional resources.  Patient does not feel she needs additional support at this time.  Patient was late to prenatal care and says she did not find out she was pregnant until  seven months, denies any SDOH impacting care.      Patient and baby were positive for marijuana (baby urine and meconium).  Patient confirms marijuana use for anxiety and to assist with her appetite.  Patient denies having a [place] medical marijuana card, [place] moved to [place] from [place] and had a medical card through the [facility].  Patient is in the process of obtaining a [place] medical card.  Patient declines use around the children/in care taking role.    Patient denies any additional needs at this time, has supports at home in place and plans to continue with her psychiatry team.  MSW will continue to follow.

## 2025-03-18 NOTE — PROGRESS NOTES
Obstetrics Postpartum Progress Note    Events  No acute events overnight. Feeling well, no complaints.    Subjective  Pain: no  Bleeding: lochia minimal  Diet: taking regular diet  Voiding: without difficulty  Bowel: passing flatus  Ambulating: as tolerated  Feeding: plans to breastfeed    Vitals  Temp:  [36.2 °C (97.2 °F)-36.7 °C (98.1 °F)] 36.4 °C (97.6 °F)  Heart Rate:  [46-64] 52  Resp:  [16-18] 18  BP: ()/(50-59) 103/59    I&O    Intake/Output Summary (Last 24 hours) at 3/18/2025 1026  Last data filed at 3/18/2025 0230  Gross per 24 hour   Intake 3133 ml   Output 1300 ml   Net 1833 ml       Physical Exam  General: well  Abdomen: soft, nondistended, non-tender  Fundus: firm and at umbilicus  Incision: healing well  Extremities: symmetric    Labs  Labs Reviewed:  Lab Results   Component Value Date    ABO A 2025    LABRH Positive 2025        CBC Results         25     0620 1024 1314    WBC 20.20 21.40 10.3    RBC 2.90 3.90 4.24    HGB 8.4 11.1 13.7    HCT 25.7 33.1 39.5    MCV 88.6 84.9 93.2    MCH 29.0 28.5 32.3    MCHC 32.7 33.5 34.7     309 242       Assessment/Plan     Problem-based Assessment and Plan    Geneva Roman is a 29 y.o.  postop day 1 s/p , Low Transverse.    1) Post-op: appropriate course, continue routine care  2) Hemodynamics: stable, asymptomatic acute blood loss anemia, will give IV Iron today and tomorrow  3) VTE: low risk, ambulation  4) Rh/immunization: not indicated  5) Anticipate discharge home on post-op day # 3    Meenu Rodriguez MD

## 2025-03-18 NOTE — PLAN OF CARE
VSS, pt pain well controlled with toradol and tylenol, pt ambulating around the room without difficulty

## 2025-03-18 NOTE — PLAN OF CARE
Problem: Adult Inpatient Plan of Care  Goal: Plan of Care Review  Outcome: Progressing  Flowsheets (Taken 3/18/2025 1544)  Outcome Evaluation: VSS Bleeding WNL pt perorming own self care pt tender explained increased tenderess normal with tubal ligatation  Plan of Care Reviewed With: patient     Problem: Adult Inpatient Plan of Care  Goal: Patient-Specific Goal (Individualized)  Outcome: Progressing  Flowsheets (Taken 3/18/2025 1544)  Patient/Family-Specific Goals (Include Timeframe): Social workconsult completed and pt has appointment with psychiatrist  Individualized Care Needs: given information and numbers on medications to help her  Anxieties, Fears or Concerns: mo being able to handle two children at home   Plan of Care Review  Plan of Care Reviewed With: patient  Outcome Evaluation: VSS Bleeding WNL pt perorming own self care pt tender explained increased tenderess normal with tubal ligatation

## 2025-03-18 NOTE — PLAN OF CARE
Plan of Care Review  Outcome Evaluation: VSS, bleeding WNL, dressing intact, voiding spontaneously post Posadas catheter removal, pain controlled with around the clock dosing, assistance with  provided, formula feeding per maternal request, independent with self care, all questions answered

## 2025-03-18 NOTE — NURSING NOTE
Parents given infant and mom teaching   Questions answered pt incision with sight drainage from center earlier at 1100 no further drainage noted

## 2025-03-19 VITALS
HEART RATE: 54 BPM | OXYGEN SATURATION: 99 % | SYSTOLIC BLOOD PRESSURE: 120 MMHG | BODY MASS INDEX: 32.14 KG/M2 | RESPIRATION RATE: 16 BRPM | DIASTOLIC BLOOD PRESSURE: 59 MMHG | WEIGHT: 200 LBS | TEMPERATURE: 98.1 F | HEIGHT: 66 IN

## 2025-03-19 PROCEDURE — 63700000 HC SELF-ADMINISTRABLE DRUG: Performed by: OBSTETRICS & GYNECOLOGY

## 2025-03-19 PROCEDURE — 63700000 HC SELF-ADMINISTRABLE DRUG: Performed by: STUDENT IN AN ORGANIZED HEALTH CARE EDUCATION/TRAINING PROGRAM

## 2025-03-19 RX ORDER — IBUPROFEN 600 MG/1
600 TABLET ORAL
Qty: 30 TABLET | Refills: 0 | Status: SHIPPED | OUTPATIENT
Start: 2025-03-19 | End: 2025-03-27

## 2025-03-19 RX ORDER — ACETAMINOPHEN 325 MG/1
975 TABLET ORAL
Qty: 360 TABLET | Refills: 0 | Status: SHIPPED | OUTPATIENT
Start: 2025-03-19 | End: 2025-04-18

## 2025-03-19 RX ORDER — IBUPROFEN 600 MG/1
600 TABLET ORAL EVERY 6 HOURS PRN
Qty: 30 TABLET | Refills: 0 | Status: SHIPPED | OUTPATIENT
Start: 2025-03-19 | End: 2025-03-29

## 2025-03-19 RX ORDER — FERROUS SULFATE 325(65) MG
325 TABLET, DELAYED RELEASE (ENTERIC COATED) ORAL
Qty: 90 TABLET | Refills: 0 | Status: SHIPPED | OUTPATIENT
Start: 2025-03-19 | End: 2025-04-18

## 2025-03-19 RX ADMIN — ACETAMINOPHEN 975 MG: 325 TABLET ORAL at 11:32

## 2025-03-19 RX ADMIN — IBUPROFEN 600 MG: 600 TABLET, FILM COATED ORAL at 03:34

## 2025-03-19 RX ADMIN — ACETAMINOPHEN 975 MG: 325 TABLET ORAL at 05:40

## 2025-03-19 RX ADMIN — SENNOSIDES AND DOCUSATE SODIUM 1 TABLET: 50; 8.6 TABLET ORAL at 10:00

## 2025-03-19 RX ADMIN — IBUPROFEN 600 MG: 600 TABLET, FILM COATED ORAL at 16:15

## 2025-03-19 RX ADMIN — SERTRALINE HYDROCHLORIDE 100 MG: 100 TABLET ORAL at 10:12

## 2025-03-19 RX ADMIN — IBUPROFEN 600 MG: 600 TABLET, FILM COATED ORAL at 09:30

## 2025-03-19 RX ADMIN — ACETAMINOPHEN 975 MG: 325 TABLET ORAL at 16:15

## 2025-03-19 RX ADMIN — PRENATAL VITAMINS-IRON FUMARATE 27 MG IRON-FOLIC ACID 0.8 MG TABLET 1 TABLET: at 10:12

## 2025-03-19 NOTE — PLAN OF CARE
Plan of Care Review  Plan of Care Reviewed With: patient  Progress: improving  Outcome Evaluation: Pain well controlled with current regimen. Patient reports increased anxiety and requested Hydroxyzine order. Pt transitioned to PO motrin because IV catheter was bent. FOB not present at bedside.

## 2025-03-19 NOTE — PROGRESS NOTES
Obstetrics Postpartum Progress Note  POD#2 s/p repeat LTCS, bilateral salpingectomies    Events  No acute events overnight.    Subjective  Pain: no  Bleeding: lochia minimal  Diet: taking regular diet  Voiding: without difficulty  Bowel: passing flatus  Ambulating: as tolerated    Vitals  Temp:  [36.6 °C (97.9 °F)-37 °C (98.6 °F)] 36.6 °C (97.9 °F)  Heart Rate:  [47-70] 47  Resp:  [18-20] 18  BP: ()/(50-61) 92/50    Physical Exam  General: well  Abdomen: soft, nondistended, non-tender  Fundus: firm, at umbilicus, and nontender  Incision: healing well  Extremities: symmetric and no edema    Assessment/Plan   Problem-based Assessment and Plan    Geneva Lashell Roman is a 29 y.o.  postop day 2 s/p , Low Transverse.    Appropriate postpartum course. Continue routine postpartum care.  Patient desires discharge home today.  She received one dose of IV iron yesterday, then IV was taken out last night. Will discharge home on oral iron.  Reviewed all discharge instructions, precautions, follow-up appointments, discharge medications. All questions answered.    Frances Spring MD  3/19/2025  9:51 AM

## 2025-03-19 NOTE — PLAN OF CARE
Problem: Adult Inpatient Plan of Care  Goal: Plan of Care Review  Outcome: Met  Flowsheets (Taken 3/19/2025 1104)  Progress: improving  Outcome Evaluation: VSS pain managed with tylenol and motrin pt desires early discharge made appontment with pyschciatist to start back on pysch meds mom bonding well with baby to go home with mom today discharge instructions given questions answered  Plan of Care Reviewed With: patient     Problem: Adult Inpatient Plan of Care  Goal: Patient-Specific Goal (Individualized)  Outcome: Met  Flowsheets (Taken 3/19/2025 1104)  Patient/Family-Specific Goals (Include Timeframe): going home today with infant  Individualized Care Needs: support given with feeding and emotional support at home  Anxieties, Fears or Concerns: going home today   Plan of Care Review  Plan of Care Reviewed With: patient  Progress: improving  Outcome Evaluation: VSS pain managed with tylenol and motrin pt desires early discharge made appontment with pyschciatist to start back on pysch meds mom bonding well with baby to go home with mom today discharge instructions given questions answered

## 2025-03-19 NOTE — DISCHARGE SUMMARY
Inpatient Discharge Summary    BRIEF OVERVIEW  Admitting Provider: Joseline Galo MD  Discharge Provider: Frances Spring MD  Primary care provider: Ester Gomez DO    Admission Date: 3/17/2025     Discharge Date: 25    Discharge Diagnosis  Postpartum state    Discharge Disposition  Home    Discharge Medications     Medication List        START taking these medications      acetaminophen 325 mg tablet  Commonly known as: TYLENOL  Take 3 tablets (975 mg total) by mouth every 6 (six) hours.  Dose: 975 mg     ferrous sulfate 325 mg (65 mg iron) EC tablet  Take 1 tablet (325 mg total) by mouth 3 (three) times a day with meals.  Dose: 325 mg     * ibuprofen 600 mg tablet  Commonly known as: MOTRIN  Take 1 tablet (600 mg total) by mouth every 6 (six) hours for 30 doses.  Dose: 600 mg     * ibuprofen 600 mg tablet  Commonly known as: MOTRIN  Take 1 tablet (600 mg total) by mouth every 6 (six) hours as needed for pain for up to 10 days.  Dose: 600 mg           * This list has 2 medication(s) that are the same as other medications prescribed for you. Read the directions carefully, and ask your doctor or other care provider to review them with you.                CONTINUE taking these medications      prenatal vit no.130-iron-folic 27 mg iron- 800 mcg tablet tablet  Take 1 tablet by mouth daily.  Dose: 1 tablet     sertraline 100 mg tablet  Commonly known as: ZOLOFT  TAKE 2 TABLETS BY MOUTH EVERY DAY  Dose: 200 mg            STOP taking these medications      ARIPiprazole 5 mg tablet  Commonly known as: ABILIFY     azithromycin 250 mg tablet  Commonly known as: ZITHROMAX     norethindrone-e.estradioL-iron 1 mg-20 mcg (21)/75 mg (7) per tablet  Commonly known as: JUNEL FE  (28)              Outpatient Follow-Up  Postpartum followup in 6 weeks        DETAILS OF HOSPITAL STAY      Geneva Roman is a  who presented to L&D and had a repeat  delivery and bilateral salpingectomies.  She had a routine postpartum course and was discharged in stable condition. Discharge instructions were reviewed in detail.     She will follow up in 6 weeks.     Relevant consults: none  Relevant labs: none    Frances Spring MD  3/19/2025  9:56 AM

## 2025-03-19 NOTE — NURSING NOTE
mom discharged to home with baby  Mom given infant discharge instructions   Questions answered   Bracelets identified   Baby placed into car seat by parents

## 2025-06-16 ENCOUNTER — HOSPITAL ENCOUNTER (EMERGENCY)
Facility: HOSPITAL | Age: 30
Discharge: HOME | End: 2025-06-16
Attending: STUDENT IN AN ORGANIZED HEALTH CARE EDUCATION/TRAINING PROGRAM | Admitting: STUDENT IN AN ORGANIZED HEALTH CARE EDUCATION/TRAINING PROGRAM
Payer: COMMERCIAL

## 2025-06-16 VITALS
TEMPERATURE: 97.4 F | OXYGEN SATURATION: 98 % | RESPIRATION RATE: 18 BRPM | HEART RATE: 72 BPM | DIASTOLIC BLOOD PRESSURE: 70 MMHG | SYSTOLIC BLOOD PRESSURE: 107 MMHG | WEIGHT: 215 LBS | BODY MASS INDEX: 34.55 KG/M2 | HEIGHT: 66 IN

## 2025-06-16 DIAGNOSIS — T88.8XXA FLUID COLLECTION AT SURGICAL SITE, INITIAL ENCOUNTER: Primary | ICD-10-CM

## 2025-06-16 PROCEDURE — 99282 EMERGENCY DEPT VISIT SF MDM: CPT

## 2025-06-16 RX ORDER — CARIPRAZINE 1.5 MG/1
CAPSULE, GELATIN COATED ORAL DAILY
COMMUNITY
Start: 2025-06-09

## 2025-06-16 RX ORDER — SULFAMETHOXAZOLE AND TRIMETHOPRIM 800; 160 MG/1; MG/1
1 TABLET ORAL 2 TIMES DAILY
Qty: 10 TABLET | Refills: 0 | Status: SHIPPED | OUTPATIENT
Start: 2025-06-16 | End: 2025-06-21

## 2025-06-16 ASSESSMENT — ENCOUNTER SYMPTOMS
EYE DISCHARGE: 0
ABDOMINAL PAIN: 0
VOMITING: 0
CONSTIPATION: 0
SINUS PAIN: 0
NUMBNESS: 0
NAUSEA: 0
FEVER: 0
DIARRHEA: 0
WOUND: 1
CHILLS: 0
FACIAL SWELLING: 0
EYE PAIN: 0

## 2025-06-17 NOTE — DISCHARGE INSTRUCTIONS
You were evaluated today for a fluid collection that drained spontaneously on your  site.  There is no evidence of active infection, but you have been provided prophylactic antibiotics.  Keep the area clean and dry and wash daily with gentle soap and water, see the attached information for further wound care instructions. Your results have been attached to your paperwork.    Please finish the course of antibiotics even if you are feeling better.  Monitor the area for worsening symptoms if you develop fever/chills, redness streaking, increased drainage return to the ED immediately.  For pain, use over-the-counter Tylenol 500 mg every 4-6 hours (do not exceed 3,000 mg per day) or Ibuprofen 400 mg every 4-6 hours (do not exceed 3,200 mg per day).    Please follow up with your OB/GYN for further management of the symptoms.  Please follow up with your PCP within 1 week for re-evaluation. It is very important to follow up with your healthcare provider for re-evaluation.     Please return to the ED immediately if you develop any new or worsening symptoms.   You stated that you are not breast-feeding, do not take Bactrim or ibuprofen while breast-feeding.

## 2025-06-17 NOTE — ED PROVIDER NOTES
Wound Check         Chief Complaint   Patient presents with    Wound Check        HPI   Patient is a 29-year-old female who is  with history of 2 prior  sections with low transverse incision with last being on 3/2025 presenting to the emergency department for evaluation of surgical site problem x 1 week.  Patient states her  scar has been well-healing until 1 week ago when she noticed a small pimple-like area over the right lateralmost aspect of the surgical scar, patient states that this popped spontaneously leaking clear fluid which prompted her to go to the emergency department immediately. Patient is not breastfeeding. Patient states she has not had fevers, chills, malaise, abdominal pain or other symptoms of concern. Otherwise postpartum is going well.  Patient states she came to the emergency department to evaluate for possible infection as the area left behind is now open.  Patient also states that she has had an embedded piercing in her right cheekbone for approximately 6 months.  This does not cause any pain or is not bothersome but patient would like it evaluated to see if it is infected and needs removed.  No facial asymmetry or difficulty with moving muscles. ROS as below      Past Medical History:   Diagnosis Date    ADHD     Anxiety     Decreased libido     Depression     Endometriosis     Heartburn     Hyponatremia     Obesity     Plantar fasciitis     Snoring     Thyroid disease          Past Surgical History   Procedure Laterality Date     section      Laparoscopic endometriosis fulguration       AND 2018    REPEAT  SECTION 4.9.25 N/A 3/17/2025    Performed by Veronica Aldana MD at [facility]    [place] tooth extraction         Family History   Problem Relation Name Age of Onset    Thyroid disease Biological Mother      Mental illness Biological Mother      Thyroid disease Biological Father      Mental illness Biological Father      Depression Biological  "Sister      Depression Biological Sister      Depression Biological Sister      Multiple sclerosis Biological Sister      Depression Biological Brother      No Known Problems Maternal Grandmother      Colon cancer Maternal Grandfather      No Known Problems Paternal Grandmother      No Known Problems Paternal Grandfather         Social History     Tobacco Use    Smoking status: Former     Types: Cigarettes    Smokeless tobacco: Never   Substance Use Topics    Alcohol use: Never    Drug use: Not Currently     Types: Marijuana     Comment: used up until 7 months of pregnancy       Systems Reviewed from Nursing Triage:               Review of Systems   Constitutional:  Negative for chills and fever.   HENT:  Negative for facial swelling and sinus pain.    Eyes:  Negative for pain, discharge and visual disturbance.   Gastrointestinal:  Negative for abdominal pain, constipation, diarrhea, nausea and vomiting.   Skin:  Positive for wound.   Neurological:  Negative for numbness.            ED Triage Vitals [06/16/25 2054]   Temp Heart Rate Resp BP SpO2   36.3 °C (97.4 °F) 72 18 107/70 98 %      Temp src Heart Rate Source Patient Position BP Location FiO2 (%) (Set)   -- Monitor -- -- --       Vitals:    06/16/25 2054   BP: 107/70   Pulse: 72   Resp: 18   Temp: 36.3 °C (97.4 °F)   SpO2: 98%   Weight: 97.5 kg (215 lb)   Height: 1.676 m (5' 6\")                         Physical Exam  Vitals and nursing note reviewed.   Constitutional:       General: She is not in acute distress.     Appearance: Normal appearance.   HENT:      Head: Normocephalic and atraumatic.      Comments: Patient with palpable foreign body embedded over right cheek, states this is a piercing that has been embedded for over 6 months  Eyes:      Conjunctiva/sclera: Conjunctivae normal.   Cardiovascular:      Rate and Rhythm: Normal rate and regular rhythm.      Heart sounds: Normal heart sounds.   Pulmonary:      Effort: Pulmonary effort is normal. No " accessory muscle usage or respiratory distress.      Breath sounds: Normal breath sounds and air entry.   Abdominal:      General: Abdomen is flat. A surgical scar is present.      Palpations: Abdomen is soft.      Tenderness: There is no abdominal tenderness.      Comments: Low-transverse well-healing surgical scar on lower abdomen with 3 mm open area of dehiscence on the right lateralmost aspect without surrounding erythema, warmth, active drainage   Musculoskeletal:      Cervical back: Normal range of motion.   Skin:     General: Skin is warm and dry.      Findings: No rash.   Neurological:      Mental Status: She is alert. Mental status is at baseline.      Gait: Gait is intact.   Psychiatric:         Speech: Speech normal.         Behavior: Behavior normal. Behavior is cooperative.         Cognition and Memory: Cognition normal.              No orders to display       Labs Reviewed - No data to display    No orders to display         Procedures    Final diagnoses:   None       ED Course & MDM       Vital Signs Review: Vital signs have been reviewed. The oxygen saturation is  SpO2: 98 % which is normal.    ED Course as of 06/16/25 2201 Mon Jun 16, 2025 2157 Patient is alert and oriented and able to ambulate without difficulty in ED.  Patient with small area of wound dehiscence over lateralmost aspect of surgical scar without evidence of infection, no fevers or chills.  Vital signs stable  Impression: Surgical scar problem  Plan: Reassurance [MJ]   2201 After extensive discussion with patient regarding risk versus benefits, patient states that she would not like the piercing removed from the right side of her face if there is no outward evidence of infection.  She has been in contact with plastic surgery prior and states that she will follow-up with them if she changes her mind. [MJ]      ED Course User Index  [MJ] Gopal Weems PA C           Medical Decision Making  Patient is a 29-year-old female  who is otherwise healthy who was 3 months post repeat low-horizontal  presenting with spontaneous drainage at the right most lateral aspect of her surgical scar.  Patient states her surgical scar has been otherwise well-healing until the last week when she noticed a very small pimple-like formation that spontaneously drained clear fluid today prompting patient to come to ED. no fever, chills, surrounding redness, redness streaking, pus drainage.  Exam reveals nontender abdomen and generally well-appearing patient who is able to ambulate in ED without distress.  Patient is afebrile and states that she feels well making acute systemic infection less likely.  No surrounding redness or erythema over area, small area of wound dehiscence without active drainage.  Will cover patient with Bactrim given allergy to Doxy and patient denies breast-feeding.  Patient was scheduled appointment with her OB/GYN tomorrow morning.  Patient is hemodynamically stable and denies new/worsening symptoms while in the ED. Plan is to take Bactrim, local wound care, monitor for signs of infection with strict return precautions advised. Patient comfortable and agreeable to discharge to partner at bedside.  Discharge instructions and return precautions discussed with patient. Patient expressed understanding of this plan and all patient questions answered. Patient discharged.     Risk  Prescription drug management.        10:01 PM        KAT Hebert  2025      We are in a period of time with increased volumes and decreased capacity.     This document was created using dragon dictation software.  There might be some typographical errors due to this technology.       Gopal Weems PA C  [phone]

## 2025-06-17 NOTE — ED ATTESTATION NOTE
The [facility] conducted the evaluation, management, and treatment of this patient.  I was the Supervising Physician.  I was available for real-time consultation, as needed by the [facility].          Deejay Urias,   06/16/25 [id number]

## (undated) DEVICE — CONTAINER SPECIMEN STERILE 5OZ

## (undated) DEVICE — DRESSING TELFA 3X8

## (undated) DEVICE — Device

## (undated) DEVICE — TOWEL SURGICAL W17XL27IN BLUE COTTON STANDARD PREWASHED DELI

## (undated) DEVICE — STERILE BLOOD COLLECTION TUBES

## (undated) DEVICE — SLEEVE SCD COMFORT KNEE LENGTH MED

## (undated) DEVICE — TUBING SMOKE EVAC PENCIL COATED

## (undated) DEVICE — PAD GROUND ELECTROSURGICAL W/CORD

## (undated) DEVICE — APPLICATOR CHLORAPREP 26ML ORANGE TINT

## (undated) DEVICE — BULB SYRINGE IRRIGATION STERILE

## (undated) DEVICE — PACK RFID MLH C SECTION

## (undated) DEVICE — PUMP BOTTLE CAVILON FILM BARRIER NO STING

## (undated) DEVICE — PENCIL ESU HANDSWITCHING W/HOL

## (undated) DEVICE — SPONGE LAP 18X18 SAFE-T RFID ENHANCED XRAY

## (undated) DEVICE — LIGASURE IMPACT INSTRUMENT

## (undated) DEVICE — DRESSING ABD STERILE 7X8IN

## (undated) DEVICE — SPONGE CURITY GAUZE 4